# Patient Record
Sex: FEMALE | Race: WHITE | NOT HISPANIC OR LATINO | Employment: FULL TIME | URBAN - METROPOLITAN AREA
[De-identification: names, ages, dates, MRNs, and addresses within clinical notes are randomized per-mention and may not be internally consistent; named-entity substitution may affect disease eponyms.]

---

## 2017-02-16 ENCOUNTER — GENERIC CONVERSION - ENCOUNTER (OUTPATIENT)
Dept: OTHER | Facility: OTHER | Age: 57
End: 2017-02-16

## 2017-03-06 ENCOUNTER — ALLSCRIPTS OFFICE VISIT (OUTPATIENT)
Dept: OTHER | Facility: OTHER | Age: 57
End: 2017-03-06

## 2017-03-22 ENCOUNTER — GENERIC CONVERSION - ENCOUNTER (OUTPATIENT)
Dept: OTHER | Facility: OTHER | Age: 57
End: 2017-03-22

## 2017-03-29 ENCOUNTER — ALLSCRIPTS OFFICE VISIT (OUTPATIENT)
Dept: OTHER | Facility: OTHER | Age: 57
End: 2017-03-29

## 2017-05-06 ENCOUNTER — GENERIC CONVERSION - ENCOUNTER (OUTPATIENT)
Dept: OTHER | Facility: OTHER | Age: 57
End: 2017-05-06

## 2017-05-07 ENCOUNTER — GENERIC CONVERSION - ENCOUNTER (OUTPATIENT)
Dept: OTHER | Facility: OTHER | Age: 57
End: 2017-05-07

## 2017-05-07 LAB
INTERPRETATION (HISTORICAL): NORMAL
T4 FREE SERPL-MCNC: 1.33 NG/DL (ref 0.82–1.77)

## 2017-05-18 ENCOUNTER — GENERIC CONVERSION - ENCOUNTER (OUTPATIENT)
Dept: OTHER | Facility: OTHER | Age: 57
End: 2017-05-18

## 2017-05-18 ENCOUNTER — ALLSCRIPTS OFFICE VISIT (OUTPATIENT)
Dept: OTHER | Facility: OTHER | Age: 57
End: 2017-05-18

## 2017-05-25 ENCOUNTER — GENERIC CONVERSION - ENCOUNTER (OUTPATIENT)
Dept: OTHER | Facility: OTHER | Age: 57
End: 2017-05-25

## 2018-01-29 VITALS
HEART RATE: 88 BPM | HEIGHT: 68 IN | WEIGHT: 233 LBS | BODY MASS INDEX: 36.68 KG/M2 | RESPIRATION RATE: 18 BRPM | SYSTOLIC BLOOD PRESSURE: 130 MMHG | RESPIRATION RATE: 20 BRPM | HEART RATE: 100 BPM | TEMPERATURE: 98.2 F | TEMPERATURE: 97.6 F | BODY MASS INDEX: 35.31 KG/M2 | WEIGHT: 242 LBS | HEART RATE: 78 BPM | RESPIRATION RATE: 18 BRPM | WEIGHT: 230 LBS | DIASTOLIC BLOOD PRESSURE: 82 MMHG | TEMPERATURE: 99.9 F | BODY MASS INDEX: 34.86 KG/M2 | DIASTOLIC BLOOD PRESSURE: 80 MMHG | HEIGHT: 68 IN | HEIGHT: 68 IN | DIASTOLIC BLOOD PRESSURE: 76 MMHG | SYSTOLIC BLOOD PRESSURE: 132 MMHG | SYSTOLIC BLOOD PRESSURE: 132 MMHG

## 2018-01-29 DIAGNOSIS — I10 ESSENTIAL HYPERTENSION: Primary | ICD-10-CM

## 2018-01-29 RX ORDER — LEVOTHYROXINE SODIUM 150 UG/1
CAPSULE ORAL DAILY
COMMUNITY
Start: 2015-06-12 | End: 2018-02-07 | Stop reason: SDUPTHER

## 2018-01-29 RX ORDER — HYDROCHLOROTHIAZIDE 12.5 MG/1
1 CAPSULE, GELATIN COATED ORAL DAILY
COMMUNITY
Start: 2014-03-28 | End: 2018-01-29 | Stop reason: SDUPTHER

## 2018-01-29 RX ORDER — HYDROCHLOROTHIAZIDE 12.5 MG/1
12.5 CAPSULE, GELATIN COATED ORAL DAILY
Qty: 90 CAPSULE | Refills: 1 | Status: SHIPPED | OUTPATIENT
Start: 2018-01-29 | End: 2018-08-09 | Stop reason: SDUPTHER

## 2018-01-29 RX ORDER — AMLODIPINE BESYLATE 10 MG/1
10 TABLET ORAL DAILY
Qty: 90 TABLET | Refills: 1 | Status: SHIPPED | OUTPATIENT
Start: 2018-01-29 | End: 2018-02-06 | Stop reason: SDUPTHER

## 2018-01-29 RX ORDER — OMEPRAZOLE 40 MG/1
1 CAPSULE, DELAYED RELEASE ORAL DAILY
COMMUNITY
Start: 2016-02-29 | End: 2018-05-17 | Stop reason: SDUPTHER

## 2018-01-29 NOTE — RESULT NOTES
Discussion/Summary   Appointment 5/18/17   Please print and put in my folder  Dr Karine Pedroza      Verified Results  (1) TSH [id number] 07:31AM [employer]     Test Name Result Flag Reference   TSH 2 260 uIU/mL  0 450-4 500     (1) COMPREHENSIVE METABOLIC PANEL [id number] 86:62PF [employer]     Test Name Result Flag Reference   Glucose, Serum 105 mg/dL H 65-99   BUN 18 mg/dL  6-24   Creatinine, Serum 0 63 mg/dL  0 57-1 00   BUN/Creatinine Ratio 29 H 9-23   Sodium, Serum 142 mmol/L  134-144   Potassium, Serum 4 0 mmol/L  3 5-5 2   Chloride, Serum 101 mmol/L     Carbon Dioxide, Total 23 mmol/L  18-29   Calcium, Serum 9 8 mg/dL  8 7-10 2   Protein, Total, Serum 6 9 g/dL  6 0-8 5   Albumin, Serum 4 4 g/dL  3 5-5 5   Globulin, Total 2 5 g/dL  1 5-4 5   A/G Ratio 1 8  1 2-2 2   Bilirubin, Total 0 4 mg/dL  0 0-1 2   Alkaline Phosphatase, S 102 IU/L     AST (SGOT) 31 IU/L  0-40   ALT (SGPT) 42 IU/L H 0-32   eGFR If NonAfricn Am 99 mL/min/1 73  >59   eGFR If Africn Am 114 mL/min/1 73  >59     (1) LIPID PANEL FASTING W DIRECT LDL REFLEX [id number] 07:31AM [employer]     Test Name Result Flag Reference   Cholesterol, Total 188 mg/dL  100-199   Triglycerides 103 mg/dL  0-149   HDL Cholesterol 50 mg/dL  >39   LDL Cholesterol Calc 117 mg/dL H 0-99     (1) HEMOGLOBIN A1C [id number] 07:31AM [employer]     Test Name Result Flag Reference   Hemoglobin A1c 5 8 % H 4 8-5 6   Pre-diabetes: 5 7 - 6 4           Diabetes: >6 4           Glycemic control for adults with diabetes: <7 0     (LC) Thyroxine (T4) Free, Direct, S [id number] 07:31AM [employer]     Test Name Result Flag Reference   T4,Free(Direct) 1 33 ng/dL  0 82-1 77     [facility]) Cardiovascular Risk Assessment [id number] 07:31AM [facility]     Test Name Result Flag Reference   Interpretation Note     Supplement report is available  PDF Image

## 2018-01-29 NOTE — RESULT NOTES
Verified Results  * MAMMO SCREENING BILATERAL W CAD [id number] 12:53PM Martita Allan Order Number: [id number]     Test Name Result Flag Reference   MAMMO SCREENING BILATERAL W CAD (Report)     Patient History:   Patient is postmenopausal    Benign stereotactic FNA biopsy, December 1, 2010  Patient's BMI is 35 0  Reason for exam: screening (asymptomatic)  Screening     Mammo Screening Bilateral W CAD: August 8, 2016 - Check In #:    [de-identified]   Bilateral CC and MLO view(s) were taken  Technologist: NABIL Arreguin   Prior study comparison: August 4, 2015, bilateral screening    mammogram performed at [place] [address]  June 23, 2014, bilateral screening mammogram performed at [place] [address]  April 1, 2013, bilateral screening    mammogram performed at [place] [address]  January 18, 2012, bilateral screening mammogram performed at [place] [address]  There are scattered fibroglandular densities  Bilateral digital    mammography is performed  No dominant soft tissue mass,    architectural distortion or suspicious calcifications are noted  The skin and nipple contours are within normal limits  Scattered benign appearing calcifications are noted  A biopsy    clip in the right breast is again noted  No evidence of    malignancy  No significant changes when compared to prior    studies  1  No evidence of malignancy  2  No significant change when compared with the prior study  ASSESSMENT: BiRad:2 - Benign     Recommendation:   Routine screening mammogram of both breasts in 1 year  A reminder letter will be scheduled   Analyzed by CAD     8-10% of cancers will be missed on mammography  Management of a    palpable abnormality must be based on clinical grounds  Patients   will be notified of their results via letter from our facility  Accredited by [employer] of [employer] and FDA  Transcription Location: [facility] [address]: [id number]     Risk Value(s):   Tyrer-Cuzick 10 Year: 8 131%, Tyrer-Cuzick Lifetime: 22 581%,    Myriad Table: 1 5%, JESSICA 5 Year: 1 7%, NCI Lifetime: 10 2%   Signed by:   Guerda Alcantar MD   8/9/16       Discussion/Summary   Lida,   Your mammogram is normal    Dr Cinthia Johnson

## 2018-01-29 NOTE — RESULT NOTES
Message   Please inform patient the biopsies were negative for Morel's esophagus  She should continue her current medications for 3-6 months and then slowly wean off the pantoprazole or PPI therapy and follow-up as needed at that point  Verified Results  (1) TISSUE EXAM [id number] 08:43AM Karlis Picking     Test Name Result Flag Reference   LAB AP CASE REPORT (Report)     Surgical Pathology Report             Case: [id number]                   Authorizing Provider: Akosua Weaver MD      Collected:      06/24/2016 0843        Ordering Location:   [facility]  Received:      06/24/2016 [address]akeisha U  61                                      Pathologist:      Reena Correa MD                              Specimen:  Esophagogastric junction, cold bx rule out morel's esophagus   LAB AP FINAL DIAGNOSIS (Report)     A  Esophagus, GE junction, biopsy:        - Squamous mucosa with reactive features and fewer than 2   eosinophils per high power field  - Focal bacterial organisms are identified within disrupted   fragments of squamous epithelium (cocci)        - No intestinal metaplasia, dysplasia or malignancy is   identified  Interpretation performed at , [address]   Electronically signed by Reena Correa MD on 6/27/2016 at 3:40 PM   LAB AP SURGICAL ADDITIONAL INFORMATION (Report)     These tests were developed and their performance characteristics   determined by [facility] or [facility]  They may not be cleared or approved by the U S  Food and   Drug Administration  The FDA has determined that such clearance or   approval is not necessary  These tests are used for clinical purposes  They should not be regarded as investigational or for research  This   laboratory has been approved by CLIA 88, designated as a high-complexity   laboratory and is qualified to perform these tests     LAB AP SHAINA DESCRIPTION (Report)     A  The specimen is received in formalin, labeled with the patient's name   and hospital number, and is designated GE junction biopsy rule out   Neville's esophagus  The specimen consists of 4 tan soft tissue fragments   each measuring 0 3-0 4 cm  Entirely submitted  One cassette  Note: The estimated total formalin fixation time based upon information   provided by the submitting clinician and the standard processing schedule   is 19 75 hours      MAC       Signatures   Electronically signed by : ANABEL Chen ; Jul 11 2016  5:12PM EST                       (Author)

## 2018-01-29 NOTE — RESULT NOTES
Message   Appointment 5/24/16   Please print and put in my folder   Dr Lester Chaney     Verified Results  (1) COMPREHENSIVE METABOLIC PANEL [id number] 04:95EH [facility]     Test Name Result Flag Reference   Glucose, Serum 105 mg/dL H 65-99   BUN 19 mg/dL  6-24   Creatinine, Serum 0 71 mg/dL  0 57-1 00   eGFR If NonAfricn Am 95 mL/min/1 73  >59   eGFR If Africn Am 109 mL/min/1 73  >59   BUN/Creatinine Ratio 27 H 9-23   Sodium, Serum 141 mmol/L  134-144   Potassium, Serum 4 3 mmol/L  3 5-5 2   Chloride, Serum 101 mmol/L     Carbon Dioxide, Total 26 mmol/L  18-29   Calcium, Serum 9 9 mg/dL  8 7-10 2   Protein, Total, Serum 7 2 g/dL  6 0-8 5   Albumin, Serum 4 4 g/dL  3 5-5 5   Globulin, Total 2 8 g/dL  1 5-4 5   A/G Ratio 1 6  1 1-2 5   Bilirubin, Total 0 3 mg/dL  0 0-1 2   Alkaline Phosphatase, S 96 IU/L     AST (SGOT) 26 IU/L  0-40   ALT (SGPT) 40 IU/L H 0-32     (LC) Lipid Panel With LDL/HDL Ratio [id number] 07:31AM [facility]     Test Name Result Flag Reference   Cholesterol, Total 197 mg/dL  100-199   Triglycerides 116 mg/dL  0-149   HDL Cholesterol 52 mg/dL  >39   According to ATP-III Guidelines, HDL-C >59 mg/dL is considered a  negative risk factor for CHD  VLDL Cholesterol Jackson 23 mg/dL  5-40   LDL Cholesterol Calc 122 mg/dL H 0-99   LDL/HDL Ratio 2 3 ratio units  0 0-3 2   LDL/HDL Ratio                                                             Men  Women                                               1/2 Avg  Risk  1 0    1 5                                                   Avg Risk  3 6    3 2                                                2X Avg  Risk  6 2    5 0                                                3X Avg  Risk  8 0    6 1     (LC) TSH+Free T4 [id number] 07:31AM [facility]     Test Name Result Flag Reference   TSH 3 130 uIU/mL  0 450-4 500   T4,Free(Direct) 1 27 ng/dL  0 82-1 77     (1) FREE T3 [id number] 07:31AM [facility]     Test Name Result Flag Reference Triiodothyronine,Free,Serum 2 9 pg/mL  2 0-4 4     [facility]) Cardiovascular Risk Assessment [id number] 07:31AM Jono Jose     Test Name Result Flag Reference   Interpretation Note     Supplement report is available  PDF Image

## 2018-02-06 DIAGNOSIS — I10 ESSENTIAL HYPERTENSION: ICD-10-CM

## 2018-02-06 RX ORDER — AMLODIPINE BESYLATE 10 MG/1
10 TABLET ORAL DAILY
Qty: 90 TABLET | Refills: 0 | Status: SHIPPED | OUTPATIENT
Start: 2018-02-06 | End: 2018-08-14 | Stop reason: SDUPTHER

## 2018-02-07 DIAGNOSIS — I10 ESSENTIAL HYPERTENSION: ICD-10-CM

## 2018-02-07 DIAGNOSIS — R73.01 IMPAIRED FASTING GLUCOSE: ICD-10-CM

## 2018-02-07 DIAGNOSIS — E89.0 HYPOTHYROIDISM, POSTRADIOIODINE THERAPY: ICD-10-CM

## 2018-02-07 DIAGNOSIS — E03.9 HYPOTHYROIDISM, UNSPECIFIED TYPE: Primary | ICD-10-CM

## 2018-02-07 DIAGNOSIS — Z12.31 SCREENING MAMMOGRAM, ENCOUNTER FOR: ICD-10-CM

## 2018-02-07 RX ORDER — LEVOTHYROXINE SODIUM 150 UG/1
1 CAPSULE ORAL DAILY
Qty: 90 CAPSULE | Refills: 1 | Status: SHIPPED | OUTPATIENT
Start: 2018-02-07 | End: 2018-08-14 | Stop reason: SDUPTHER

## 2018-02-07 RX ORDER — LEVOTHYROXINE SODIUM 0.15 MG/1
150 TABLET ORAL DAILY
Refills: 0 | Status: CANCELLED | OUTPATIENT
Start: 2018-02-07

## 2018-02-07 NOTE — TELEPHONE ENCOUNTER
Pt is requesting a mammogram and labwork then she will make an apt for a follow up    Please advise  Thanks  lenore

## 2018-02-13 NOTE — RESULT NOTES
Message   Please inform patient the biopsies were negative for H  pylori  Please make sure that she is taking omeprazole regularly  She should have a repeat upper endoscopy in 3 months time to ensure healing of the severe esophagitis  Her colonoscopy was normal, please put in for 10 year colonoscopy recall  Verified Results  (1) TISSUE EXAM [id number] 09:04AM Sandra Michel     Test Name Result Flag Reference   LAB AP CASE REPORT (Report)     Surgical Pathology Report             Case: [id number]                   Authorizing Provider: Shante Quiñones MD      Collected:      02/29/2016 [id number]        Ordering Location:   [facility]  Received:      02/29/2016 [address] [place] [address]                                     Pathologist:      Dimitri Delgado MD                               Specimen:  Stomach, Gastric body biopsy gastritis r/o h pylori   LAB AP FINAL DIAGNOSIS (Report)     A  Gastric body, biopsy:    - Chronic inactive oxyntic & antral gastritis, negative for curvilinear   Helicobacter pylori, confirmed by immunohistochemical stains, evaluated   with appropriate positive & negative controls  - No atrophy or intestinal metaplasia identified     - No epithelial dysplasia and no evidence of malignancy  LAB AP SURGICAL ADDITIONAL INFORMATION (Report)     These tests were developed and their performance characteristics   determined by [address] [place] [address] or [facility]  They may not be cleared or approved by the [place] S  Food and   Drug Administration  The FDA has determined that such clearance or   approval is not necessary  These tests are used for clinical purposes  They should not be regarded as investigational or for research  This   laboratory has been approved by CLIA 88, designated as a high-complexity   laboratory and is qualified to perform these tests  LAB AP GROSS DESCRIPTION (Report)     A   The specimen is received in formalin, labeled with the patient's name   and hospital number, and is designated gastric body biopsy  The   specimen consists of 4 tan-pink soft tissue fragments measuring 0 1   centimeters, 0 4 centimeters, 0 6 centimeters, and 0 6 centimeters in   greatest dimension  Entirely submitted  One cassette  Note: The estimated total formalin fixation time based upon information   provided by the submitting clinician and the standard processing schedule   is 19 5 hours   MAS       Signatures   Electronically signed by : ANABEL Ruiz ; Mar 15 2016  5:04PM EST                       (Author)

## 2018-02-13 NOTE — RESULT NOTES
Verified Results  * XR FOOT 3+ VIEW LEFT [id number] 04:09PM Deacon Schmidt Order Number: [id number]     Test Name Result Flag Reference   XR FOOT 3+ VW LEFT (Report)     LEFT FOOT     INDICATION: Posttraumatic left 2nd toe pain   COMPARISON: None     VIEWS: 3; 3 images     FINDINGS:   Plantar and posterior calcaneal heel spurs     There is no acute fracture or dislocation  No degenerative changes  No lytic or blastic lesions are seen  Soft tissues are unremarkable  IMPRESSION:   Heel spurs   No acute osseous abnormality  Workstation performed: [id number]     Signed by:   Elen Hernandez MD   5/18/16       Discussion/Summary   Xray of the left foot was normal   No bone abnormalities seen  Next step is MRI or orthopedic surgeon evaluation as discussed    Dr Juliet Flores

## 2018-02-13 NOTE — RESULT NOTES
Message   Appointment 11/17/16   Please print and put in my folder  Dr Per Quintana      Verified Results  (1) MAGNESIUM [id number] 08:02AM Lelan Serge     Test Name Result Flag Reference   Magnesium, Serum 2 1 mg/dL  1 6-2 3     (1) VITAMIN B12 [id number] 08:02AM Lelan Serge     Test Name Result Flag Reference   Vitamin B12 362 pg/mL  211-946     (1) COMPREHENSIVE METABOLIC PANEL [id number] 42:50LK Lelan Serge     Test Name Result Flag Reference   Glucose, Serum 102 mg/dL H 65-99   BUN 15 mg/dL  6-24   Creatinine, Serum 0 75 mg/dL  0 57-1 00   eGFR If NonAfricn Am 89 mL/min/1 73  >59   eGFR If Africn Am 102 mL/min/1 73  >59   BUN/Creatinine Ratio 20  9-23   Sodium, Serum 143 mmol/L  136-144   Potassium, Serum 4 3 mmol/L  3 5-5 2   Chloride, Serum 102 mmol/L     Carbon Dioxide, Total 25 mmol/L  18-29   Calcium, Serum 9 8 mg/dL  8 7-10 2   Protein, Total, Serum 7 1 g/dL  6 0-8 5   Albumin, Serum 4 4 g/dL  3 5-5 5   Globulin, Total 2 7 g/dL  1 5-4 5   A/G Ratio 1 6  1 1-2 5   Bilirubin, Total 0 4 mg/dL  0 0-1 2   Alkaline Phosphatase, S 106 IU/L     AST (SGOT) 23 IU/L  0-40   ALT (SGPT) 47 IU/L H 0-32     (1) LIPID PANEL FASTING W DIRECT LDL REFLEX [id number] 08:02AM [email or web address]     Test Name Result Flag Reference   Cholesterol, Total 185 mg/dL  100-199   Triglycerides 95 mg/dL  0-149   HDL Cholesterol 53 mg/dL  >39   According to ATP-III Guidelines, HDL-C >59 mg/dL is considered a  negative risk factor for CHD     LDL Cholesterol Calc 113 mg/dL H 0-99     (1) TSH [id number] 08:02AM Lelan Serge     Test Name Result Flag Reference   TSH 2 260 uIU/mL  0 450-4 500     (1) HEMOGLOBIN A1C [id number] 08:02AM [facility]     Test Name Result Flag Reference   Hemoglobin A1c 6 2 % H 4 8-5 6   Pre-diabetes: 5 7 - 6 4           Diabetes: >6 4           Glycemic control for adults with diabetes: <7 0     (LC) Thyroxine (T4) Free, Direct, S [id number] 08:02AM Karine Kaur     Test Name Result Flag Reference T4,Free(Direct) 1 33 ng/dL  0 82-1 77     [facility]) Cardiovascular Risk Assessment [id number] 08:02AM Charity Pretty     Test Name Result Flag Reference   Interpretation Note     Supplement report is available  PDF Image

## 2018-03-07 LAB
ALBUMIN SERPL-MCNC: 4.5 G/DL (ref 3.5–5.5)
ALBUMIN/GLOB SERPL: 1.8 {RATIO} (ref 1.2–2.2)
ALP SERPL-CCNC: 101 IU/L (ref 39–117)
ALT SERPL-CCNC: 33 IU/L (ref 0–32)
AMBIG ABBREV DEFAULT: NORMAL
AMBIG ABBREV DEFAULT: NORMAL
AST SERPL-CCNC: 21 IU/L (ref 0–40)
BASOPHILS # BLD AUTO: 0 X10E3/UL (ref 0–0.2)
BASOPHILS NFR BLD AUTO: 1 %
BILIRUB SERPL-MCNC: 0.5 MG/DL (ref 0–1.2)
BUN SERPL-MCNC: 18 MG/DL (ref 6–24)
BUN/CREAT SERPL: 23 (ref 9–23)
CALCIUM SERPL-MCNC: 10.2 MG/DL (ref 8.7–10.2)
CHLORIDE SERPL-SCNC: 100 MMOL/L (ref 96–106)
CHOLEST SERPL-MCNC: 192 MG/DL (ref 100–199)
CO2 SERPL-SCNC: 27 MMOL/L (ref 18–29)
CREAT SERPL-MCNC: 0.79 MG/DL (ref 0.57–1)
EOSINOPHIL # BLD AUTO: 0.1 X10E3/UL (ref 0–0.4)
EOSINOPHIL NFR BLD AUTO: 2 %
ERYTHROCYTE [DISTWIDTH] IN BLOOD BY AUTOMATED COUNT: 14.5 % (ref 12.3–15.4)
GLOBULIN SER-MCNC: 2.5 G/DL (ref 1.5–4.5)
GLUCOSE SERPL-MCNC: 105 MG/DL (ref 65–99)
HBA1C MFR BLD: 5.7 % (ref 4.8–5.6)
HCT VFR BLD AUTO: 43.7 % (ref 34–46.6)
HDLC SERPL-MCNC: 54 MG/DL
HGB BLD-MCNC: 14.1 G/DL (ref 11.1–15.9)
IMM GRANULOCYTES # BLD: 0 X10E3/UL (ref 0–0.1)
IMM GRANULOCYTES NFR BLD: 0 %
LDLC SERPL CALC-MCNC: 115 MG/DL (ref 0–99)
LYMPHOCYTES # BLD AUTO: 2.1 X10E3/UL (ref 0.7–3.1)
LYMPHOCYTES NFR BLD AUTO: 38 %
MCH RBC QN AUTO: 30.1 PG (ref 26.6–33)
MCHC RBC AUTO-ENTMCNC: 32.3 G/DL (ref 31.5–35.7)
MCV RBC AUTO: 93 FL (ref 79–97)
MICRODELETION SYND BLD/T FISH: NORMAL
MONOCYTES # BLD AUTO: 0.4 X10E3/UL (ref 0.1–0.9)
MONOCYTES NFR BLD AUTO: 7 %
NEUTROPHILS # BLD AUTO: 2.9 X10E3/UL (ref 1.4–7)
NEUTROPHILS NFR BLD AUTO: 52 %
PLATELET # BLD AUTO: 233 X10E3/UL (ref 150–379)
POTASSIUM SERPL-SCNC: 4.3 MMOL/L (ref 3.5–5.2)
PROT SERPL-MCNC: 7 G/DL (ref 6–8.5)
RBC # BLD AUTO: 4.68 X10E6/UL (ref 3.77–5.28)
SL AMB EGFR AFRICAN AMERICAN: 95 ML/MIN/1.73
SL AMB EGFR NON AFRICAN AMERICAN: 82 ML/MIN/1.73
SL AMB VLDL CHOLESTEROL CALC: 23 MG/DL (ref 5–40)
SODIUM SERPL-SCNC: 142 MMOL/L (ref 134–144)
T4 FREE SERPL-MCNC: 1.43 NG/DL (ref 0.82–1.77)
TRIGL SERPL-MCNC: 113 MG/DL (ref 0–149)
TSH SERPL DL<=0.005 MIU/L-ACNC: 3.4 UIU/ML (ref 0.45–4.5)
WBC # BLD AUTO: 5.5 X10E3/UL (ref 3.4–10.8)

## 2018-03-16 ENCOUNTER — HOSPITAL ENCOUNTER (OUTPATIENT)
Dept: RADIOLOGY | Facility: HOSPITAL | Age: 58
Discharge: HOME | End: 2018-03-16
Payer: COMMERCIAL

## 2018-03-16 DIAGNOSIS — Z12.31 SCREENING MAMMOGRAM, ENCOUNTER FOR: ICD-10-CM

## 2018-03-16 PROCEDURE — 77067 SCR MAMMO BI INCL CAD: CPT

## 2018-05-17 DIAGNOSIS — K21.9 GERD WITHOUT ESOPHAGITIS: Primary | ICD-10-CM

## 2018-05-17 RX ORDER — OMEPRAZOLE 40 MG/1
40 CAPSULE, DELAYED RELEASE ORAL DAILY
Qty: 90 CAPSULE | Refills: 0 | Status: SHIPPED | OUTPATIENT
Start: 2018-05-17 | End: 2018-08-14 | Stop reason: SDUPTHER

## 2018-05-17 NOTE — TELEPHONE ENCOUNTER
I refilled her omeprazole, but it has been a year since  Her last appointment      Please ask her to schedule an appointment  Gaby Mantilla, DO

## 2018-05-18 NOTE — TELEPHONE ENCOUNTER
Spoke to patients  and he took the message for the patient    She will call and make an appointment when she gets home from work    No further action needed

## 2018-08-09 DIAGNOSIS — I10 ESSENTIAL HYPERTENSION: ICD-10-CM

## 2018-08-09 RX ORDER — HYDROCHLOROTHIAZIDE 12.5 MG/1
12.5 CAPSULE, GELATIN COATED ORAL DAILY
Qty: 90 CAPSULE | Refills: 0 | Status: SHIPPED | OUTPATIENT
Start: 2018-08-09 | End: 2018-08-14 | Stop reason: SDUPTHER

## 2018-08-09 NOTE — TELEPHONE ENCOUNTER
I called patient to find out if she meant to schedule with dr Clara Winters since dr Yash Driver is her PCP and that is who she has been seeing   Left message requesting a call back

## 2018-08-09 NOTE — TELEPHONE ENCOUNTER
She has not been seen in over a year    I refilled her HCTZ, but she needs an appointment  Елена Garcia, DO

## 2018-08-14 DIAGNOSIS — E03.9 HYPOTHYROIDISM, UNSPECIFIED TYPE: ICD-10-CM

## 2018-08-14 DIAGNOSIS — K21.9 GERD WITHOUT ESOPHAGITIS: ICD-10-CM

## 2018-08-14 DIAGNOSIS — I10 ESSENTIAL HYPERTENSION: ICD-10-CM

## 2018-08-14 RX ORDER — HYDROCHLOROTHIAZIDE 12.5 MG/1
12.5 CAPSULE, GELATIN COATED ORAL DAILY
Qty: 90 CAPSULE | Refills: 0 | Status: SHIPPED | OUTPATIENT
Start: 2018-08-14 | End: 2018-09-25 | Stop reason: SDUPTHER

## 2018-08-14 RX ORDER — OMEPRAZOLE 40 MG/1
40 CAPSULE, DELAYED RELEASE ORAL DAILY
Qty: 90 CAPSULE | Refills: 0 | Status: SHIPPED | OUTPATIENT
Start: 2018-08-14 | End: 2018-09-25 | Stop reason: SDUPTHER

## 2018-08-14 RX ORDER — LEVOTHYROXINE SODIUM 150 UG/1
1 CAPSULE ORAL DAILY
Qty: 90 CAPSULE | Refills: 0 | Status: SHIPPED | OUTPATIENT
Start: 2018-08-14 | End: 2018-09-25 | Stop reason: SDUPTHER

## 2018-08-14 RX ORDER — AMLODIPINE BESYLATE 10 MG/1
10 TABLET ORAL DAILY
Qty: 90 TABLET | Refills: 0 | Status: SHIPPED | OUTPATIENT
Start: 2018-08-14 | End: 2018-09-25 | Stop reason: SDUPTHER

## 2018-08-14 NOTE — TELEPHONE ENCOUNTER
Refill Omeprazole, hydrochlorothiazide, tirosint, amlodipine    [facility] If e-scribe goes to [place]    Thank you

## 2018-09-25 ENCOUNTER — OFFICE VISIT (OUTPATIENT)
Dept: FAMILY MEDICINE CLINIC | Facility: CLINIC | Age: 58
End: 2018-09-25
Payer: COMMERCIAL

## 2018-09-25 VITALS
DIASTOLIC BLOOD PRESSURE: 74 MMHG | RESPIRATION RATE: 16 BRPM | BODY MASS INDEX: 34.86 KG/M2 | SYSTOLIC BLOOD PRESSURE: 114 MMHG | HEIGHT: 68 IN | HEART RATE: 84 BPM | WEIGHT: 230 LBS | TEMPERATURE: 98.5 F

## 2018-09-25 DIAGNOSIS — R73.01 IMPAIRED FASTING GLUCOSE: ICD-10-CM

## 2018-09-25 DIAGNOSIS — Z79.899 ENCOUNTER FOR LONG-TERM CURRENT USE OF MEDICATION: ICD-10-CM

## 2018-09-25 DIAGNOSIS — E89.0 HYPOTHYROIDISM, POSTRADIOIODINE THERAPY: ICD-10-CM

## 2018-09-25 DIAGNOSIS — Z00.00 ANNUAL PHYSICAL EXAM: Primary | ICD-10-CM

## 2018-09-25 DIAGNOSIS — K21.9 GERD WITHOUT ESOPHAGITIS: ICD-10-CM

## 2018-09-25 DIAGNOSIS — I10 ESSENTIAL HYPERTENSION: ICD-10-CM

## 2018-09-25 PROCEDURE — 99396 PREV VISIT EST AGE 40-64: CPT | Performed by: FAMILY MEDICINE

## 2018-09-25 RX ORDER — AMLODIPINE BESYLATE 10 MG/1
10 TABLET ORAL DAILY
Qty: 90 TABLET | Refills: 3 | Status: SHIPPED | OUTPATIENT
Start: 2018-09-25 | End: 2020-02-21

## 2018-09-25 RX ORDER — OMEPRAZOLE 40 MG/1
40 CAPSULE, DELAYED RELEASE ORAL DAILY
Qty: 90 CAPSULE | Refills: 3 | Status: SHIPPED | OUTPATIENT
Start: 2018-09-25 | End: 2019-09-17 | Stop reason: SDUPTHER

## 2018-09-25 RX ORDER — LEVOTHYROXINE SODIUM 0.15 MG/1
150 TABLET ORAL DAILY
Qty: 90 TABLET | Refills: 3 | Status: SHIPPED | OUTPATIENT
Start: 2018-09-25 | End: 2019-01-02 | Stop reason: SDUPTHER

## 2018-09-25 RX ORDER — HYDROCHLOROTHIAZIDE 12.5 MG/1
12.5 CAPSULE, GELATIN COATED ORAL DAILY
Qty: 90 CAPSULE | Refills: 3 | Status: SHIPPED | OUTPATIENT
Start: 2018-09-25 | End: 2019-09-09 | Stop reason: SDUPTHER

## 2018-09-25 NOTE — LETTER
September 25, 2018     Patient: Naresh Chaves   YOB: 1960   Date of Visit: 9/25/2018       To Whom it May Concern:    Naresh Chaves is under my professional care  She was seen in my office on 9/25/2018  Due to medical condition [facility] needs to park as close as possible to work and avoid uneven surfaces  If you have any questions or concerns, please don't hesitate to call           Sincerely,          Felipe Reynolds DO        CC: No Recipients

## 2018-09-25 NOTE — PROGRESS NOTES
FAMILY PRACTICE HEALTH MAINTENANCE OFFICE VISIT  [facility]    NAME: Shantel Sanchez  AGE: 60 y o  SEX: female  : 1960     DATE: 2018    Assessment and Plan     Problem List Items Addressed This Visit     GERD without esophagitis     Unable to wean off          Relevant Medications    omeprazole (PriLOSEC) 40 MG capsule    Essential hypertension    Relevant Medications    hydrochlorothiazide (MICROZIDE) 12 5 mg capsule    amLODIPine (NORVASC) 10 mg tablet    Other Relevant Orders    CBC    Comprehensive metabolic panel    Lipid Panel with Direct LDL reflex    Hypothyroidism, postradioiodine therapy    Relevant Medications    levothyroxine 150 mcg tablet    Other Relevant Orders    TSH, 3rd generation    T4, free    Impaired fasting glucose    Relevant Orders    Comprehensive metabolic panel    Hemoglobin A1C      Other Visit Diagnoses     Annual physical exam    -  Primary    Encounter for long-term current use of medication        Relevant Orders    Vitamin B12    Magnesium            · Patient Counseling:   · Nutrition: Stressed importance of a well balanced diet, moderation of sodium/saturated fat, caloric balance and sufficient intake of fiber  · Exercise: Stressed the importance of regular exercise with a goal of 150 minutes per week  · Dental Health: Discussed daily flossing and brushing and regular dental visits     · Immunizations reviewed Flu shot is going to be done at work , Светлана Perdomo done this year  · Discussed benefits of screening mammogram    · Discussed the patient's BMI with her  The BMI is above average; BMI management plan is completed     Return in about 6 months (around 3/25/2019) for Next scheduled follow up  Chief Complaint     Chief Complaint   Patient presents with    Physical Exam     lj       History of Present Illness     She broke her foot 3 years ago and her whole foot cracked  She has pain with walking on the side walk by work  Well Adult Physical   Patient here for a comprehensive physical exam       Diet and Physical Activity  Diet: well balanced diet  Weight concerns: Patient has class 1 obesity (BMI 30-34  9)  Exercise: frequently      Depression Screen  PHQ-9 Depression Screening    PHQ-9:    Frequency of the following problems over the past two weeks:       Little interest or pleasure in doing things:  0 - not at all  Feeling down, depressed, or hopeless:  0 - not at all  PHQ-2 Score:  0          General Health  Hearing: Normal:  bilateral  Vision: wears glasses  Dental: regular dental visits    Reproductive Health  Having some stress incontinence      The following portions of the patient's history were reviewed and updated as appropriate: allergies, current medications, past family history, past medical history, past social history, past surgical history and problem list     Review of Systems     Review of Systems   Constitutional: Negative  Respiratory: Negative  Cardiovascular: Negative  Past Medical History     Past Medical History:   Diagnosis Date    Disease of thyroid gland     Herniated lumbar intervertebral disc     Hypertension     Hypothyroidism     Pneumonia        Past Surgical History     Past Surgical History:   Procedure Laterality Date    EGD AND COLONOSCOPY N/A 2/29/2016    Procedure: EGD AND COLONOSCOPY;  Surgeon: Tristan Arzate MD;  Location[facility]; Service:     ESOPHAGOGASTRODUODENOSCOPY N/A 6/24/2016    Procedure: ESOPHAGOGASTRODUODENOSCOPY (EGD); Surgeon: Tristan Arzate MD;  Location: [facility];   Service:     FOOT ARTHRODESIS, MODIFIED SCHUMACHER Right 2011    FOOT SURGERY      TOE SURGERY      hammtertoe, each toe       Social History     Social History     Social History    Marital status: /Civil Union     Spouse name: N/A    Number of children: N/A    Years of education: N/A     Occupational History          Social History Main Topics    Smoking status: Former Smoker     Quit date: 2/28/1989    Smokeless tobacco: Never Used    Alcohol use 4 2 oz/week     7 Glasses of wine per week      Comment: occ    Drug use: No    Sexual activity: Not Asked     Other Topics Concern    None     Social History Narrative    None       Family History     Family History   Problem Relation Age of Onset    Diabetes Mother     Heart disease Father         cardiac    Hypertension Father     Colon cancer Sister     Diabetes Family     Heart disease Family     Tuberculosis Other     Breast cancer Neg Hx     Mental illness Neg Hx        Current Medications       Current Outpatient Prescriptions:     amLODIPine (NORVASC) 10 mg tablet, Take 1 tablet (10 mg total) by mouth daily for 90 days, Disp: 90 tablet, Rfl: 3    hydrochlorothiazide (MICROZIDE) 12 5 mg capsule, Take 1 capsule (12 5 mg total) by mouth daily, Disp: 90 capsule, Rfl: 3    levothyroxine 150 mcg tablet, Take 1 tablet (150 mcg total) by mouth daily, Disp: 90 tablet, Rfl: 3    omeprazole (PriLOSEC) 40 MG capsule, Take 1 capsule (40 mg total) by mouth daily, Disp: 90 capsule, Rfl: 3     Allergies     Allergies   Allergen Reactions    Sulfa Antibiotics Hives       Objective     /74   Pulse 84   Temp 98 5 °F (36 9 °C)   Resp 16   Ht 5' 8" (1 727 m)   Wt 104 kg (230 lb)   LMP 06/29/2015 (Exact Date)   BMI 34 97 kg/m²      Physical Exam   Constitutional: She is oriented to person, place, and time  She appears well-developed and well-nourished  HENT:   Head: Normocephalic and atraumatic  Right Ear: External ear normal    Left Ear: External ear normal    Mouth/Throat: Oropharynx is clear and moist    Eyes: Pupils are equal, round, and reactive to light  Neck: Normal range of motion  Cardiovascular: Normal rate, regular rhythm and normal heart sounds  Exam reveals no friction rub  No murmur heard  Pulmonary/Chest: Effort normal and breath sounds normal  No respiratory distress   She has no wheezes  She has no rales  Abdominal: Soft  Bowel sounds are normal  She exhibits no distension and no mass  There is no tenderness  There is no rebound and no guarding  Musculoskeletal: Normal range of motion  She exhibits no edema  Neurological: She is alert and oriented to person, place, and time  She has normal reflexes  Skin: Skin is warm  Nursing note and vitals reviewed           Visual Acuity Screening    Right eye Left eye Both eyes   Without correction:      With correction: 20/20 20/20 20/20       Health Maintenance     Health Maintenance   Topic Date Due    Depression Screening PHQ  1960    DTaP,Tdap,and Td Vaccines (1 - Tdap) 01/17/1980    INFLUENZA VACCINE  09/01/2018    MAMMOGRAM  03/16/2019    PAP SMEAR  06/17/2020    CRC Screening: Colonoscopy  02/28/2026     Immunization History   Administered Date(s) Administered    Influenza Quadrivalent, 6-35 Months IM 11/18/2015    Influenza TIV (IM) 10/20/2016       Ebony Su DO  [address]

## 2018-10-04 DIAGNOSIS — E89.0 HYPOTHYROIDISM, POSTRADIOIODINE THERAPY: Primary | ICD-10-CM

## 2018-10-04 RX ORDER — LEVOTHYROXINE SODIUM 150 UG/1
CAPSULE ORAL
Qty: 84 CAPSULE | Refills: 1 | Status: SHIPPED | OUTPATIENT
Start: 2018-10-04 | End: 2019-01-01 | Stop reason: SDUPTHER

## 2018-10-06 LAB
ALBUMIN SERPL-MCNC: 4.5 G/DL (ref 3.5–5.5)
ALBUMIN/GLOB SERPL: 1.7 {RATIO} (ref 1.2–2.2)
ALP SERPL-CCNC: 100 IU/L (ref 39–117)
ALT SERPL-CCNC: 49 IU/L (ref 0–32)
AST SERPL-CCNC: 32 IU/L (ref 0–40)
BASOPHILS # BLD AUTO: 0 X10E3/UL (ref 0–0.2)
BASOPHILS NFR BLD AUTO: 1 %
BILIRUB SERPL-MCNC: 0.4 MG/DL (ref 0–1.2)
BUN SERPL-MCNC: 15 MG/DL (ref 6–24)
BUN/CREAT SERPL: 21 (ref 9–23)
CALCIUM SERPL-MCNC: 10 MG/DL (ref 8.7–10.2)
CHLORIDE SERPL-SCNC: 103 MMOL/L (ref 96–106)
CHOLEST SERPL-MCNC: 162 MG/DL (ref 100–199)
CO2 SERPL-SCNC: 23 MMOL/L (ref 20–29)
CREAT SERPL-MCNC: 0.73 MG/DL (ref 0.57–1)
EOSINOPHIL # BLD AUTO: 0.1 X10E3/UL (ref 0–0.4)
EOSINOPHIL NFR BLD AUTO: 2 %
ERYTHROCYTE [DISTWIDTH] IN BLOOD BY AUTOMATED COUNT: 13.8 % (ref 12.3–15.4)
GLOBULIN SER-MCNC: 2.6 G/DL (ref 1.5–4.5)
GLUCOSE SERPL-MCNC: 106 MG/DL (ref 65–99)
HBA1C MFR BLD: 5.9 % (ref 4.8–5.6)
HCT VFR BLD AUTO: 42.2 % (ref 34–46.6)
HDLC SERPL-MCNC: 50 MG/DL
HGB BLD-MCNC: 14.4 G/DL (ref 11.1–15.9)
IMM GRANULOCYTES # BLD: 0 X10E3/UL (ref 0–0.1)
IMM GRANULOCYTES NFR BLD: 0 %
LABCORP COMMENT: NORMAL
LDLC SERPL CALC-MCNC: 96 MG/DL (ref 0–99)
LYMPHOCYTES # BLD AUTO: 2 X10E3/UL (ref 0.7–3.1)
LYMPHOCYTES NFR BLD AUTO: 37 %
MAGNESIUM SERPL-MCNC: 2.2 MG/DL (ref 1.6–2.3)
MCH RBC QN AUTO: 30.7 PG (ref 26.6–33)
MCHC RBC AUTO-ENTMCNC: 34.1 G/DL (ref 31.5–35.7)
MCV RBC AUTO: 90 FL (ref 79–97)
MICRODELETION SYND BLD/T FISH: NORMAL
MONOCYTES # BLD AUTO: 0.6 X10E3/UL (ref 0.1–0.9)
MONOCYTES NFR BLD AUTO: 11 %
NEUTROPHILS # BLD AUTO: 2.7 X10E3/UL (ref 1.4–7)
NEUTROPHILS NFR BLD AUTO: 49 %
PLATELET # BLD AUTO: 215 X10E3/UL (ref 150–379)
POTASSIUM SERPL-SCNC: 4 MMOL/L (ref 3.5–5.2)
PROT SERPL-MCNC: 7.1 G/DL (ref 6–8.5)
RBC # BLD AUTO: 4.69 X10E6/UL (ref 3.77–5.28)
SL AMB EGFR AFRICAN AMERICAN: 104 ML/MIN/1.73
SL AMB EGFR NON AFRICAN AMERICAN: 90 ML/MIN/1.73
SODIUM SERPL-SCNC: 141 MMOL/L (ref 134–144)
T4 FREE SERPL-MCNC: 1.61 NG/DL (ref 0.82–1.77)
TRIGL SERPL-MCNC: 80 MG/DL (ref 0–149)
TSH SERPL DL<=0.005 MIU/L-ACNC: 1.36 UIU/ML (ref 0.45–4.5)
VIT B12 SERPL-MCNC: 899 PG/ML (ref 232–1245)
WBC # BLD AUTO: 5.4 X10E3/UL (ref 3.4–10.8)

## 2018-10-08 ENCOUNTER — TELEPHONE (OUTPATIENT)
Dept: FAMILY MEDICINE CLINIC | Facility: CLINIC | Age: 58
End: 2018-10-08

## 2018-10-08 DIAGNOSIS — R74.8 ELEVATED LIVER ENZYMES: Primary | ICD-10-CM

## 2018-10-08 NOTE — TELEPHONE ENCOUNTER
10/8/2018 11:16 AM Blas Carter returned my call  We discussed her lab results  Her ALT is up more  She agreed to get a liver ultrasound done  Order put in patient        Message complete  Shellie Hope,

## 2018-10-08 NOTE — TELEPHONE ENCOUNTER
10/8/2018 10:28 AM Message left on message on machine to call the office  Her ALT is up to 49       Rosa Tracy DO

## 2018-10-17 ENCOUNTER — TELEPHONE (OUTPATIENT)
Dept: FAMILY MEDICINE CLINIC | Facility: CLINIC | Age: 58
End: 2018-10-17

## 2018-10-17 NOTE — TELEPHONE ENCOUNTER
Tried calling, no answer and phone just rings  If pt calls back can you please ask her if she uses HCTZ  If so will need to sent to Dr Chandler Garg to see if she would like to change medication  Thank you   Rhys Dewitt

## 2018-10-17 NOTE — TELEPHONE ENCOUNTER
Heber Machuca from [address] called to ask about pts HCTZ  pts chart states pt is allergic to Sulfa, but Evan states HCTZ contained sulfa  Looks like HCTZ was prescribed and then discontinued on same day  Need clarification if pt is taking HCTZ and if she okay to take it? Called home hpone number, daughter stated pt was not home and wouldn't be home until after 3:30 today  Can we call her after 3:30 sometime and confirm this with her?  Rita Resendez MA

## 2018-11-13 DIAGNOSIS — K21.9 GERD WITHOUT ESOPHAGITIS: ICD-10-CM

## 2018-11-13 DIAGNOSIS — I10 ESSENTIAL HYPERTENSION: ICD-10-CM

## 2018-11-13 RX ORDER — OMEPRAZOLE 40 MG/1
CAPSULE, DELAYED RELEASE ORAL
Qty: 90 CAPSULE | Refills: 0 | Status: SHIPPED | OUTPATIENT
Start: 2018-11-13 | End: 2019-09-17 | Stop reason: SDUPTHER

## 2018-11-13 RX ORDER — AMLODIPINE BESYLATE 10 MG/1
10 TABLET ORAL DAILY
Qty: 90 TABLET | Refills: 0 | Status: SHIPPED | OUTPATIENT
Start: 2018-11-13 | End: 2019-10-07 | Stop reason: ALTCHOICE

## 2018-11-19 ENCOUNTER — HOSPITAL ENCOUNTER (OUTPATIENT)
Dept: RADIOLOGY | Facility: HOSPITAL | Age: 58
Discharge: HOME | End: 2018-11-19
Payer: COMMERCIAL

## 2018-11-19 DIAGNOSIS — R74.8 ELEVATED LIVER ENZYMES: ICD-10-CM

## 2018-11-19 PROCEDURE — 76705 ECHO EXAM OF ABDOMEN: CPT

## 2018-11-20 ENCOUNTER — TELEPHONE (OUTPATIENT)
Dept: FAMILY MEDICINE CLINIC | Facility: CLINIC | Age: 58
End: 2018-11-20

## 2018-11-20 NOTE — TELEPHONE ENCOUNTER
11/20/2018 12:14 PM Called her home number and was told to call her cell at work  I then called her cell phone and left a message on her voice mail to call the office  Called regarding her liver ultrasound results - it shows a fatty liver       Elli Ceja DO

## 2018-11-20 NOTE — TELEPHONE ENCOUNTER
11/20/2018 12:32 PM discussed results with patient  We discussed options to help fatty liver heal including weight loss, low sugar diet and exercise      Message complete  Otoniel Salazar DO

## 2019-01-01 DIAGNOSIS — E89.0 HYPOTHYROIDISM, POSTRADIOIODINE THERAPY: ICD-10-CM

## 2019-01-02 RX ORDER — LEVOTHYROXINE SODIUM 150 UG/1
CAPSULE ORAL
Qty: 84 CAPSULE | Refills: 1 | Status: SHIPPED | OUTPATIENT
Start: 2019-01-02 | End: 2019-07-09 | Stop reason: SDUPTHER

## 2019-01-02 NOTE — TELEPHONE ENCOUNTER
Please call patient regarding thyroid medication refill request   Her pharmacy has requested a refill for Tirosint, but she has been getting levothyroxine  I would like to continue to prescribe levothyroxine, but I was wondering if she requested the change? Please let me know which one she would prefer    Thanks,  Rosa Tracy, DO

## 2019-07-09 DIAGNOSIS — E89.0 HYPOTHYROIDISM, POSTRADIOIODINE THERAPY: ICD-10-CM

## 2019-07-09 RX ORDER — LEVOTHYROXINE SODIUM 150 UG/1
CAPSULE ORAL
Qty: 90 CAPSULE | Refills: 0 | Status: SHIPPED | OUTPATIENT
Start: 2019-07-09 | End: 2019-09-29 | Stop reason: SDUPTHER

## 2019-08-01 ENCOUNTER — TELEPHONE (OUTPATIENT)
Dept: FAMILY MEDICINE CLINIC | Facility: CLINIC | Age: 59
End: 2019-08-01

## 2019-08-01 DIAGNOSIS — Z12.31 SCREENING MAMMOGRAM, ENCOUNTER FOR: ICD-10-CM

## 2019-08-01 DIAGNOSIS — I10 ESSENTIAL HYPERTENSION: ICD-10-CM

## 2019-08-01 DIAGNOSIS — R73.01 IMPAIRED FASTING GLUCOSE: ICD-10-CM

## 2019-08-01 DIAGNOSIS — E89.0 HYPOTHYROIDISM, POSTRADIOIODINE THERAPY: Primary | ICD-10-CM

## 2019-08-03 LAB
ALBUMIN SERPL-MCNC: 4.5 G/DL (ref 3.6–4.8)
ALBUMIN/GLOB SERPL: 1.7 {RATIO} (ref 1.2–2.2)
ALP SERPL-CCNC: 87 IU/L (ref 39–117)
ALT SERPL-CCNC: 30 IU/L (ref 0–32)
AST SERPL-CCNC: 22 IU/L (ref 0–40)
BASOPHILS # BLD AUTO: 0 X10E3/UL (ref 0–0.2)
BASOPHILS NFR BLD AUTO: 0 %
BILIRUB SERPL-MCNC: 0.4 MG/DL (ref 0–1.2)
BUN SERPL-MCNC: 17 MG/DL (ref 8–27)
BUN/CREAT SERPL: 23 (ref 12–28)
CALCIUM SERPL-MCNC: 10.2 MG/DL (ref 8.7–10.3)
CHLORIDE SERPL-SCNC: 104 MMOL/L (ref 96–106)
CHOLEST SERPL-MCNC: 175 MG/DL (ref 100–199)
CO2 SERPL-SCNC: 23 MMOL/L (ref 20–29)
CREAT SERPL-MCNC: 0.75 MG/DL (ref 0.57–1)
EOSINOPHIL # BLD AUTO: 0.1 X10E3/UL (ref 0–0.4)
EOSINOPHIL NFR BLD AUTO: 2 %
ERYTHROCYTE [DISTWIDTH] IN BLOOD BY AUTOMATED COUNT: 13.9 % (ref 12.3–15.4)
GLOBULIN SER-MCNC: 2.6 G/DL (ref 1.5–4.5)
GLUCOSE SERPL-MCNC: 102 MG/DL (ref 65–99)
HBA1C MFR BLD: 5.7 % (ref 4.8–5.6)
HCT VFR BLD AUTO: 41.5 % (ref 34–46.6)
HDLC SERPL-MCNC: 54 MG/DL
HGB BLD-MCNC: 14 G/DL (ref 11.1–15.9)
IMM GRANULOCYTES # BLD: 0 X10E3/UL (ref 0–0.1)
IMM GRANULOCYTES NFR BLD: 0 %
LDLC SERPL CALC-MCNC: 106 MG/DL (ref 0–99)
LYMPHOCYTES # BLD AUTO: 1.8 X10E3/UL (ref 0.7–3.1)
LYMPHOCYTES NFR BLD AUTO: 35 %
MCH RBC QN AUTO: 30.4 PG (ref 26.6–33)
MCHC RBC AUTO-ENTMCNC: 33.7 G/DL (ref 31.5–35.7)
MCV RBC AUTO: 90 FL (ref 79–97)
MICRODELETION SYND BLD/T FISH: NORMAL
MONOCYTES # BLD AUTO: 0.5 X10E3/UL (ref 0.1–0.9)
MONOCYTES NFR BLD AUTO: 10 %
NEUTROPHILS # BLD AUTO: 2.6 X10E3/UL (ref 1.4–7)
NEUTROPHILS NFR BLD AUTO: 53 %
PLATELET # BLD AUTO: 198 X10E3/UL (ref 150–450)
POTASSIUM SERPL-SCNC: 4.2 MMOL/L (ref 3.5–5.2)
PROT SERPL-MCNC: 7.1 G/DL (ref 6–8.5)
RBC # BLD AUTO: 4.61 X10E6/UL (ref 3.77–5.28)
SL AMB EGFR AFRICAN AMERICAN: 100 ML/MIN/1.73
SL AMB EGFR NON AFRICAN AMERICAN: 87 ML/MIN/1.73
SODIUM SERPL-SCNC: 140 MMOL/L (ref 134–144)
T4 FREE SERPL-MCNC: 1.66 NG/DL (ref 0.82–1.77)
TRIGL SERPL-MCNC: 76 MG/DL (ref 0–149)
TSH SERPL DL<=0.005 MIU/L-ACNC: 1.28 UIU/ML (ref 0.45–4.5)
WBC # BLD AUTO: 5 X10E3/UL (ref 3.4–10.8)

## 2019-09-04 ENCOUNTER — HOSPITAL ENCOUNTER (OUTPATIENT)
Dept: RADIOLOGY | Facility: HOSPITAL | Age: 59
Discharge: HOME | End: 2019-09-04
Payer: COMMERCIAL

## 2019-09-04 VITALS — WEIGHT: 224 LBS | BODY MASS INDEX: 33.95 KG/M2 | HEIGHT: 68 IN

## 2019-09-04 DIAGNOSIS — Z12.31 SCREENING MAMMOGRAM, ENCOUNTER FOR: ICD-10-CM

## 2019-09-04 PROCEDURE — 77067 SCR MAMMO BI INCL CAD: CPT

## 2019-09-09 DIAGNOSIS — I10 ESSENTIAL HYPERTENSION: ICD-10-CM

## 2019-09-09 RX ORDER — AMLODIPINE BESYLATE 10 MG/1
TABLET ORAL
Qty: 90 TABLET | Refills: 1 | Status: SHIPPED | OUTPATIENT
Start: 2019-09-09 | End: 2019-10-07 | Stop reason: ALTCHOICE

## 2019-09-09 RX ORDER — HYDROCHLOROTHIAZIDE 12.5 MG/1
CAPSULE, GELATIN COATED ORAL
Qty: 90 CAPSULE | Refills: 1 | Status: SHIPPED | OUTPATIENT
Start: 2019-09-09 | End: 2020-10-14

## 2019-09-17 DIAGNOSIS — K21.9 GERD WITHOUT ESOPHAGITIS: Primary | ICD-10-CM

## 2019-09-17 RX ORDER — OMEPRAZOLE 40 MG/1
CAPSULE, DELAYED RELEASE ORAL
Qty: 90 CAPSULE | Refills: 1 | Status: SHIPPED | OUTPATIENT
Start: 2019-09-17 | End: 2020-02-28

## 2019-09-29 DIAGNOSIS — E89.0 HYPOTHYROIDISM, POSTRADIOIODINE THERAPY: ICD-10-CM

## 2019-09-29 RX ORDER — LEVOTHYROXINE SODIUM 150 UG/1
CAPSULE ORAL
Qty: 90 CAPSULE | Refills: 0 | Status: SHIPPED | OUTPATIENT
Start: 2019-09-29 | End: 2019-12-21 | Stop reason: SDUPTHER

## 2019-10-05 ENCOUNTER — HOSPITAL ENCOUNTER (EMERGENCY)
Facility: HOSPITAL | Age: 59
Discharge: HOME | End: 2019-10-05
Attending: EMERGENCY MEDICINE
Payer: COMMERCIAL

## 2019-10-05 ENCOUNTER — APPOINTMENT (EMERGENCY)
Dept: RADIOLOGY | Facility: HOSPITAL | Age: 59
End: 2019-10-05
Payer: COMMERCIAL

## 2019-10-05 VITALS
SYSTOLIC BLOOD PRESSURE: 156 MMHG | TEMPERATURE: 99.8 F | RESPIRATION RATE: 20 BRPM | OXYGEN SATURATION: 96 % | WEIGHT: 231 LBS | BODY MASS INDEX: 35.12 KG/M2 | HEART RATE: 93 BPM | DIASTOLIC BLOOD PRESSURE: 88 MMHG

## 2019-10-05 DIAGNOSIS — S92.919A TOE FRACTURE: Primary | ICD-10-CM

## 2019-10-05 PROCEDURE — 73660 X-RAY EXAM OF TOE(S): CPT

## 2019-10-05 PROCEDURE — 99283 EMERGENCY DEPT VISIT LOW MDM: CPT

## 2019-10-05 RX ORDER — OXYCODONE HYDROCHLORIDE AND ACETAMINOPHEN 5; 325 MG/1; MG/1
1 TABLET ORAL EVERY 4 HOURS PRN
Qty: 9 TABLET | Refills: 0 | Status: SHIPPED | OUTPATIENT
Start: 2019-10-05 | End: 2019-10-07 | Stop reason: ALTCHOICE

## 2019-10-05 RX ORDER — BUPIVACAINE HYDROCHLORIDE 2.5 MG/ML
10 INJECTION, SOLUTION EPIDURAL; INFILTRATION; INTRACAUDAL ONCE
Status: COMPLETED | OUTPATIENT
Start: 2019-10-05 | End: 2019-10-05

## 2019-10-05 RX ORDER — LIDOCAINE HYDROCHLORIDE 10 MG/ML
10 INJECTION, SOLUTION EPIDURAL; INFILTRATION; INTRACAUDAL; PERINEURAL ONCE
Status: COMPLETED | OUTPATIENT
Start: 2019-10-05 | End: 2019-10-05

## 2019-10-05 RX ADMIN — LIDOCAINE HYDROCHLORIDE 10 ML: 10 INJECTION, SOLUTION EPIDURAL; INFILTRATION; INTRACAUDAL; PERINEURAL at 15:39

## 2019-10-05 RX ADMIN — BUPIVACAINE HYDROCHLORIDE 10 ML: 2.5 INJECTION, SOLUTION EPIDURAL; INFILTRATION; INTRACAUDAL at 15:38

## 2019-10-05 NOTE — ED PROVIDER NOTES
History  Chief Complaint   Patient presents with    Toe Injury     states dog broke loose she tripped, fell and injured L big toe     60year old female presents with L toe pain x1 hour  Her dog broke loose and she tripped and fell on her foot  She later noticed that her big toe was facing inwards  No foot or ankle pain  No numbness or tingling  There is some bruising beginning to develop  She has worsened pain with ambulation  No nail injury  She has not taken anything for the pain  She has had several foot surgeries before in the past on the R and L foot in [facility]  She did not hit her head or lose consciousness  No nausea or vomiting  No LOC  Prior to Admission Medications   Prescriptions Last Dose Informant Patient Reported? Taking? TIROSINT 150 MCG CAPS 10/5/2019 at Unknown time  No Yes   Sig: TAKE 1 CAPSULE BY MOUTH DAILY   amLODIPine (NORVASC) 10 mg tablet   No No   Sig: Take 1 tablet (10 mg total) by mouth daily for 90 days   amLODIPine (NORVASC) 10 mg tablet   No No   Sig: TAKE 1 TABLET (10 MG TOTAL) BY MOUTH DAILY FOR 90 DAYS   amLODIPine (NORVASC) 10 mg tablet 10/4/2019 at Unknown time  No Yes   Sig: TAKE 1 TABLET BY MOUTH DAILY   hydrochlorothiazide (MICROZIDE) 12 5 mg capsule 10/4/2019 at Unknown time  No Yes   Sig: TAKE ONE CAPSULE BY MOUTH DAILY   omeprazole (PriLOSEC) 40 MG capsule 10/4/2019 at Unknown time  No Yes   Sig: TAKE 1 CAPSULE BY MOUTH DAILY      Facility-Administered Medications: None       Past Medical History:   Diagnosis Date    Disease of thyroid gland     Herniated lumbar intervertebral disc     Hypertension     Hypothyroidism     Pneumonia        Past Surgical History:   Procedure Laterality Date    BREAST BIOPSY Left 2010    negative    EGD AND COLONOSCOPY N/A 2/29/2016    Procedure: EGD AND COLONOSCOPY;  Surgeon: Fawad Salcedo MD;  Location: [place] [address] GI LAB;   Service:     ESOPHAGOGASTRODUODENOSCOPY N/A 6/24/2016    Procedure: ESOPHAGOGASTRODUODENOSCOPY (EGD); Surgeon: Mike Nice MD;  Location: [facility] GI LAB; Service:     FOOT ARTHRODESIS, MODIFIED SCHUMACHER Right 2011    FOOT SURGERY      TOE SURGERY      hammtertoe, each toe       Family History   Problem Relation Age of Onset    Diabetes Mother     Heart disease Father         cardiac    Hypertension Father     Colon cancer Sister     Diabetes Family     Heart disease Family     Tuberculosis Other     No Known Problems Sister     No Known Problems Sister     No Known Problems Sister     Colon cancer Sister     No Known Problems Daughter     No Known Problems Daughter     Mental illness Neg Hx      I have reviewed and agree with the history as documented  Social History     Tobacco Use    Smoking status: Former Smoker     Last attempt to quit: 1989     Years since quittin 6    Smokeless tobacco: Never Used   Substance Use Topics    Alcohol use: Yes     Alcohol/week: 7 0 standard drinks     Types: 7 Glasses of wine per week     Comment: occ    Drug use: No        Review of Systems   Constitutional: Negative for chills and fever  HENT: Negative for sneezing and sore throat  Respiratory: Negative for cough and shortness of breath  Cardiovascular: Negative for chest pain, palpitations and leg swelling  Gastrointestinal: Negative for abdominal pain, constipation, diarrhea, nausea and vomiting  Musculoskeletal: Positive for arthralgias, gait problem and myalgias  Negative for back pain and joint swelling  Skin: Negative for color change, pallor, rash and wound  Neurological: Negative for dizziness, syncope, weakness, light-headedness, numbness and headaches  All other systems reviewed and are negative  Physical Exam  Physical Exam   Constitutional: She appears well-developed and well-nourished  No distress  HENT:   Head: Normocephalic and atraumatic  Nose: Nose normal    Eyes: EOM are normal    Neck: Normal range of motion     Cardiovascular: Normal rate, regular rhythm, normal heart sounds and intact distal pulses  Exam reveals no gallop and no friction rub  No murmur heard  Pulmonary/Chest: Effort normal and breath sounds normal  No stridor  No respiratory distress  She has no wheezes  She has no rales  Sp02 is 96% indicating adequate oxygenation on room air   Musculoskeletal: Normal range of motion  She exhibits edema and tenderness  She exhibits no deformity  Feet:    Skin: Skin is warm and dry  Capillary refill takes less than 2 seconds  No rash noted  She is not diaphoretic  No erythema  No pallor  Nursing note and vitals reviewed  Vital Signs  ED Triage Vitals [10/05/19 1507]   Temperature Pulse Respirations Blood Pressure SpO2   99 8 °F (37 7 °C) 93 20 156/88 96 %      Temp Source Heart Rate Source Patient Position - Orthostatic VS BP Location FiO2 (%)   Tympanic Monitor Sitting Right arm --      Pain Score       7           Vitals:    10/05/19 1507   BP: 156/88   Pulse: 93   Patient Position - Orthostatic VS: Sitting         Visual Acuity      ED Medications  Medications   bupivacaine (PF) (MARCAINE) 0 25 % injection 10 mL (10 mL Infiltration Given 10/5/19 1538)   lidocaine (PF) (XYLOCAINE-MPF) 1 % injection 10 mL (10 mL Infiltration Given 10/5/19 1539)       Diagnostic Studies  Results Reviewed     None                 XR toe great min 2 views LEFT   Final Result by Hilario Molina MD (10/05 6357)      Acute, nondisplaced intra-articular fracture of the base of the hallux proximal phalanx              Workstation performed: [id number]                    Procedures  Procedures       ED Course  ED Course as of Oct 05 1800   Sat Oct 05, 2019   1535 Called reading room                                  MDM  Number of Diagnoses or Management Options  Toe fracture:   Diagnosis management comments: Straightened toe with aide of digital nerve block and stephanie taped toes together  Placed in surgical shoe  Advised rest, ice, elevation, and ibuprofen vs tylenol as needed for pain, percocet for severe pain  Given orthopedic follow up information  Gave patient proper education regarding diagnosis  Answered all questions  Return to ED for any worsening of symptoms otherwise follow up with primary care physician for re-evaluation  Discussed plan with patient who verbalized understanding and agreed to plan  Amount and/or Complexity of Data Reviewed  Tests in the radiology section of CPT®: ordered and reviewed  Discussion of test results with the performing providers: yes  Review and summarize past medical records: yes  Discuss the patient with other providers: yes  Independent visualization of images, tracings, or specimens: yes        Disposition  Final diagnoses: Toe fracture     Time reflects when diagnosis was documented in both MDM as applicable and the Disposition within this note     Time User Action Codes Description Comment    10/5/2019  5:00 PM Hina Kearns Add [T55 [id number]] Toe fracture       ED Disposition     ED Disposition Condition Date/Time Comment    Discharge Stable Sat Oct 5, 2019  5:00 PM Pedro Chaves discharge to home/self care              Follow-up Information     Follow up With Specialties Details Why Contact Info Additional Information    Evin Jorge MD Orthopedic Surgery Schedule an appointment as soon as possible for a visit in 3 days re-evaluation of toe fracture [address] Emergency Department Emergency Medicine Go to  As needed [address] [facility], Angelia Tamayo, [place], [address]          Discharge Medication List as of 10/5/2019  5:00 PM      CONTINUE these medications which have NOT CHANGED    Details   amLODIPine (NORVASC) 10 mg tablet TAKE 1 TABLET BY MOUTH DAILY, Normal      hydrochlorothiazide (MICROZIDE) 12 5 mg capsule TAKE ONE CAPSULE BY MOUTH DAILY, Normal omeprazole (PriLOSEC) 40 MG capsule TAKE 1 CAPSULE BY MOUTH DAILY, Normal      TIROSINT 150 MCG CAPS TAKE 1 CAPSULE BY MOUTH DAILY, Normal           No discharge procedures on file      ED Provider  Electronically Signed by           Valencia James PA-C  10/05/19 2772

## 2019-10-07 ENCOUNTER — OFFICE VISIT (OUTPATIENT)
Dept: FAMILY MEDICINE CLINIC | Facility: CLINIC | Age: 59
End: 2019-10-07
Payer: COMMERCIAL

## 2019-10-07 VITALS
BODY MASS INDEX: 35.01 KG/M2 | HEIGHT: 68 IN | WEIGHT: 231 LBS | TEMPERATURE: 99.2 F | RESPIRATION RATE: 16 BRPM | SYSTOLIC BLOOD PRESSURE: 124 MMHG | DIASTOLIC BLOOD PRESSURE: 76 MMHG | HEART RATE: 90 BPM | OXYGEN SATURATION: 97 %

## 2019-10-07 DIAGNOSIS — I10 ESSENTIAL HYPERTENSION: ICD-10-CM

## 2019-10-07 DIAGNOSIS — Z11.59 NEED FOR HEPATITIS C SCREENING TEST: ICD-10-CM

## 2019-10-07 DIAGNOSIS — Z79.899 ENCOUNTER FOR LONG-TERM CURRENT USE OF MEDICATION: ICD-10-CM

## 2019-10-07 DIAGNOSIS — E89.0 HYPOTHYROIDISM, POSTRADIOIODINE THERAPY: ICD-10-CM

## 2019-10-07 DIAGNOSIS — R73.01 IMPAIRED FASTING GLUCOSE: ICD-10-CM

## 2019-10-07 DIAGNOSIS — Z00.00 ANNUAL PHYSICAL EXAM: Primary | ICD-10-CM

## 2019-10-07 PROCEDURE — 99396 PREV VISIT EST AGE 40-64: CPT | Performed by: FAMILY MEDICINE

## 2019-10-07 NOTE — PROGRESS NOTES
FAMILY PRACTICE HEALTH MAINTENANCE OFFICE VISIT  [facility]    NAME: Kee Corbett  AGE: 60 y o  SEX: female  : 1960     DATE: 10/7/2019    Assessment and Plan     1  Annual physical exam    2  Impaired fasting glucose  -     Hemoglobin A1C; Future; Expected date: 2020  -     Hemoglobin A1C    3  Essential hypertension  Assessment & Plan:  Well controlled    Orders:  -     CBC; Future; Expected date: 2020  -     Comprehensive metabolic panel; Future; Expected date: 2020  -     Lipid Panel with Direct LDL reflex; Future; Expected date: 2020  -     CBC  -     Comprehensive metabolic panel  -     Lipid Panel with Direct LDL reflex    4  Hypothyroidism, postradioiodine therapy  -     T4, free; Future; Expected date: 2020  -     TSH, 3rd generation; Future; Expected date: 2020  -     T4, free  -     TSH, 3rd generation    5  Need for hepatitis C screening test  -     Hepatitis C antibody; Future; Expected date: 2020  -     Hepatitis C antibody    6  Encounter for long-term current use of medication  -     Vitamin B12; Future; Expected date: 2020  -     Magnesium; Future; Expected date: 2020  -     Vitamin B12  -     Magnesium          · Patient Counseling:   · Nutrition: Stressed importance of a well balanced diet, moderation of sodium/saturated fat, caloric balance and sufficient intake of fiber  · Exercise: Stressed the importance of regular exercise with a goal of 150 minutes per week  · Dental Health: Discussed daily flossing and brushing and regular dental visits     · Immunizations reviewed: flu shot scheduled at work in 2 weeks   · Discussed benefits of:  Mammogram , Cervical Cancer screening and Screening labs  Pap smear declined today even though she is almost due  She has a broken toe and can't get in the stirrups   BMI Counseling: Body mass index is 35 12 kg/m²  Discussed with patient's BMI with her   The BMI is above normal  Exercise recommendations include exercising 3-5 times per week  Return in about 6 months (around 4/7/2020) for Next scheduled follow up  Chief Complaint     Chief Complaint   Patient presents with    Physical Exam     [facility]       History of Present Illness     She is feeling okay except her broken toe  Well Adult Physical   Patient here for a comprehensive physical exam       Diet and Physical Activity  Diet: well balanced diet  Exercise: frequently      Depression Screen  PHQ-9 Depression Screening    PHQ-9:    Frequency of the following problems over the past two weeks:       Little interest or pleasure in doing things:  0 - not at all  Feeling down, depressed, or hopeless:  0 - not at all  PHQ-2 Score:  0          General Health  Hearing: Normal:  bilateral  Vision: wears glasses  Dental: regular dental visits    Reproductive Health  Post-menopausal       The following portions of the patient's history were reviewed and updated as appropriate: allergies, current medications, past family history, past medical history, past social history, past surgical history and problem list     Review of Systems     Review of Systems   Constitutional: Negative  Respiratory: Negative  Cardiovascular: Negative  Past Medical History     Past Medical History:   Diagnosis Date    Disease of thyroid gland     Herniated lumbar intervertebral disc     Hypertension     Hypothyroidism     Pneumonia        Past Surgical History     Past Surgical History:   Procedure Laterality Date    BREAST BIOPSY Left 2010    negative    EGD AND COLONOSCOPY N/A 2/29/2016    Procedure: EGD AND COLONOSCOPY;  Surgeon: Alvarado Mayberry MD;  [facility]; Service:     ESOPHAGOGASTRODUODENOSCOPY N/A 6/24/2016    Procedure: ESOPHAGOGASTRODUODENOSCOPY (EGD); Surgeon: Alvarado Mayberry MD;  Location: [facility] GI LAB;   Service:     FOOT ARTHRODESIS, MODIFIED SCHUMACHER Right 2011    FOOT SURGERY  TOE SURGERY      hammtertoe, each toe       Social History     Social History     Socioeconomic History    Marital status: /Civil Union     Spouse name: None    Number of children: None    Years of education: None    Highest education level: None   Occupational History    Occupation:    Social Needs    Financial resource strain: None    Food insecurity:     Worry: None     Inability: None    Transportation needs:     Medical: None     Non-medical: None   Tobacco Use    Smoking status: Former Smoker     Last attempt to quit: 1989     Years since quittin 6    Smokeless tobacco: Never Used   Substance and Sexual Activity    Alcohol use:  Yes     Alcohol/week: 7 0 standard drinks     Types: 7 Glasses of wine per week     Comment: occ    Drug use: No    Sexual activity: None   Lifestyle    Physical activity:     Days per week: None     Minutes per session: None    Stress: None   Relationships    Social connections:     Talks on phone: None     Gets together: None     Attends Synagogue service: None     Active member of club or organization: None     Attends meetings of clubs or organizations: None     Relationship status: None    Intimate partner violence:     Fear of current or ex partner: None     Emotionally abused: None     Physically abused: None     Forced sexual activity: None   Other Topics Concern    None   Social History Narrative    None       Family History     Family History   Problem Relation Age of Onset    Diabetes Mother     Heart disease Father         cardiac    Hypertension Father     Colon cancer Sister     Diabetes Family     Heart disease Family     Tuberculosis Other     No Known Problems Sister     No Known Problems Sister     No Known Problems Sister     Colon cancer Sister     No Known Problems Daughter     No Known Problems Daughter     Mental illness Neg Hx        Current Medications       Current Outpatient Medications:    amLODIPine (NORVASC) 10 mg tablet, Take 1 tablet (10 mg total) by mouth daily for 90 days, Disp: 90 tablet, Rfl: 3    hydrochlorothiazide (MICROZIDE) 12 5 mg capsule, TAKE ONE CAPSULE BY MOUTH DAILY, Disp: 90 capsule, Rfl: 1    omeprazole (PriLOSEC) 40 MG capsule, TAKE 1 CAPSULE BY MOUTH DAILY, Disp: 90 capsule, Rfl: 1    TIROSINT 150 MCG CAPS, TAKE 1 CAPSULE BY MOUTH DAILY, Disp: 90 capsule, Rfl: 0     Allergies     Allergies   Allergen Reactions    Sulfa Antibiotics Hives       Objective     /76   Pulse 90   Temp 99 2 °F (37 3 °C)   Resp 16   Ht 5' 8" (1 727 m)   Wt 105 kg (231 lb)   LMP 06/29/2015 (Exact Date)   SpO2 97%   BMI 35 12 kg/m²      Physical Exam   Constitutional: She is oriented to person, place, and time  She appears well-developed and well-nourished  HENT:   Head: Normocephalic and atraumatic  Right Ear: External ear normal    Left Ear: External ear normal    Mouth/Throat: Oropharynx is clear and moist    Eyes: Pupils are equal, round, and reactive to light  Neck: Normal range of motion  Cardiovascular: Normal rate, regular rhythm and normal heart sounds  Exam reveals no friction rub  No murmur heard  Pulmonary/Chest: Effort normal and breath sounds normal  No respiratory distress  She has no wheezes  She has no rales  Abdominal: Soft  Bowel sounds are normal  She exhibits no distension and no mass  There is no tenderness  There is no rebound and no guarding  Musculoskeletal: Normal range of motion  She exhibits no edema  Left foot in walking brace, slow gait   Neurological: She is alert and oriented to person, place, and time  She has normal reflexes  Skin: Skin is warm  Nursing note and vitals reviewed  Vision Screening Comments: Pt refused, was just at the eye  Thursday   [facility], [address]

## 2019-10-29 ENCOUNTER — OFFICE VISIT (OUTPATIENT)
Dept: FAMILY MEDICINE CLINIC | Facility: CLINIC | Age: 59
End: 2019-10-29
Payer: COMMERCIAL

## 2019-10-29 VITALS
HEART RATE: 107 BPM | SYSTOLIC BLOOD PRESSURE: 130 MMHG | DIASTOLIC BLOOD PRESSURE: 84 MMHG | WEIGHT: 227 LBS | HEIGHT: 68 IN | TEMPERATURE: 98.9 F | RESPIRATION RATE: 16 BRPM | BODY MASS INDEX: 34.4 KG/M2 | OXYGEN SATURATION: 98 %

## 2019-10-29 DIAGNOSIS — I10 ESSENTIAL HYPERTENSION: ICD-10-CM

## 2019-10-29 DIAGNOSIS — F43.21 GRIEF: ICD-10-CM

## 2019-10-29 DIAGNOSIS — E89.0 HYPOTHYROIDISM, POSTRADIOIODINE THERAPY: ICD-10-CM

## 2019-10-29 DIAGNOSIS — J06.9 ACUTE URI: Primary | ICD-10-CM

## 2019-10-29 PROCEDURE — 99214 OFFICE O/P EST MOD 30 MIN: CPT | Performed by: NURSE PRACTITIONER

## 2019-10-29 PROCEDURE — 3079F DIAST BP 80-89 MM HG: CPT | Performed by: NURSE PRACTITIONER

## 2019-10-29 PROCEDURE — 3008F BODY MASS INDEX DOCD: CPT | Performed by: NURSE PRACTITIONER

## 2019-10-29 PROCEDURE — 3075F SYST BP GE 130 - 139MM HG: CPT | Performed by: NURSE PRACTITIONER

## 2019-10-29 RX ORDER — AZITHROMYCIN 250 MG/1
TABLET, FILM COATED ORAL
Qty: 6 TABLET | Refills: 0 | Status: SHIPPED | OUTPATIENT
Start: 2019-10-29 | End: 2019-11-03

## 2019-10-29 RX ORDER — PROMETHAZINE HYDROCHLORIDE AND CODEINE PHOSPHATE 6.25; 1 MG/5ML; MG/5ML
5 SYRUP ORAL
Qty: 30 ML | Refills: 0 | Status: SHIPPED | OUTPATIENT
Start: 2019-10-29 | End: 2020-05-27 | Stop reason: ALTCHOICE

## 2019-10-29 NOTE — PATIENT INSTRUCTIONS
Take medication with food  It is important that you take the entire course of antibiotics prescribed  May also take a probiotic of your choice to maintain healthy GI sravanthi  Can take some probiotic and yogurt with the medication  Increase fluid intake, saline nasal rinses, and hot tea with honey and lemon  Cool air humidification can be helpful as well  May take Ibuprofen or Tylenol as needed for pain or fevers  Mucinex D for sinus congestion or Coricidin HBP if you have high blood pressure or a heart condition  Mucinex or Robitussin DM are effective for cough and chest congestion  Supportive care discussed and advised  Advised to RTO for any worsening and no improvement  Follow up for no improvement and worsening of conditions  Patient advised and educated when to see immediate medical care  Do not drive and operate any machinery after taking cough medication

## 2019-10-29 NOTE — PROGRESS NOTES
Assessment/Plan:    1  Acute URI  -     azithromycin (ZITHROMAX) 250 mg tablet; Take 500mg on day 1, 250mg on days 2-5  -     promethazine-codeine (PHENERGAN WITH CODEINE) 6 25-10 mg/5 mL syrup; Take 5 mL by mouth daily at bedtime    2  Essential hypertension  Comments:  stable with current regimen    3  Hypothyroidism, postradioiodine therapy  Comments:  Last TSH done in August 2019 and stable with current regimen    4  Grief          BMI Counseling: Body mass index is 34 52 kg/m²  Discussed the patient's BMI with her  The BMI is above normal  Nutrition recommendations include reducing portion sizes, decreasing overall calorie intake, 3-5 servings of fruits/vegetables daily, reducing fast food intake, consuming healthier snacks and decreasing soda and/or juice intake  Patient Instructions: Take medication with food  It is important that you take the entire course of antibiotics prescribed  May also take a probiotic of your choice to maintain healthy GI sravanthi  Can take some probiotic and yogurt with the medication  Increase fluid intake, saline nasal rinses, and hot tea with honey and lemon  Cool air humidification can be helpful as well  May take Ibuprofen or Tylenol as needed for pain or fevers  Mucinex D for sinus congestion or Coricidin HBP if you have high blood pressure or a heart condition  Mucinex or Robitussin DM are effective for cough and chest congestion  Supportive care discussed and advised  Advised to RTO for any worsening and no improvement  Follow up for no improvement and worsening of conditions  Patient advised and educated when to see immediate medical care  Do not drive and operate any machinery after taking cough medication  Return if symptoms worsen or fail to improve  No future appointments  Subjective:      Patient ID: Elsa Jenkins is a 60 y o  female      Chief Complaint   Patient presents with    Cold Like Symptoms     wmcma    Cough Vitals:  /84   Pulse (!) 107   Temp 98 9 °F (37 2 °C)   Resp 16   Ht 5' 8" (1 727 m)   Wt 103 kg (227 lb)   LMP 2015 (Exact Date)   SpO2 98%   BMI 34 52 kg/m²     HPI  Patient stated that started with cough and sore throat couple of days ago  Stated that not able to sleep at nigh time due to cough  Denies fever, sob and chest pain  Taking corcidin HBP but not helping with cough  Complaint with BP medication  Her mother passed today  The following portions of the patient's history were reviewed and updated as appropriate: allergies, current medications, past family history, past medical history, past social history, past surgical history and problem list       Review of Systems   Constitutional: Positive for chills and fatigue  Negative for diaphoresis, fever and unexpected weight change  HENT: Positive for sore throat  Negative for congestion, dental problem, drooling, ear discharge, ear pain, facial swelling, hearing loss, mouth sores, nosebleeds, postnasal drip, rhinorrhea, sinus pressure, sinus pain, sneezing, tinnitus, trouble swallowing and voice change  Respiratory: Positive for cough  Negative for chest tightness, shortness of breath and wheezing  Cardiovascular: Negative  Gastrointestinal: Negative for abdominal pain, constipation, diarrhea, nausea and vomiting  Genitourinary: Negative  Musculoskeletal: Negative  Skin: Negative  Neurological: Negative for dizziness, weakness, light-headedness and headaches  Hematological: Negative  Objective:    Social History     Tobacco Use   Smoking Status Former Smoker    Last attempt to quit: 1989    Years since quittin 6   Smokeless Tobacco Never Used       Allergies:    Allergies   Allergen Reactions    Sulfa Antibiotics Hives         Current Outpatient Medications   Medication Sig Dispense Refill    amLODIPine (NORVASC) 10 mg tablet Take 1 tablet (10 mg total) by mouth daily for 90 days 90 tablet 3    hydrochlorothiazide (MICROZIDE) 12 5 mg capsule TAKE ONE CAPSULE BY MOUTH DAILY 90 capsule 1    omeprazole (PriLOSEC) 40 MG capsule TAKE 1 CAPSULE BY MOUTH DAILY 90 capsule 1    TIROSINT 150 MCG CAPS TAKE 1 CAPSULE BY MOUTH DAILY 90 capsule 0    azithromycin (ZITHROMAX) 250 mg tablet Take 500mg on day 1, 250mg on days 2-5 6 tablet 0    promethazine-codeine (PHENERGAN WITH CODEINE) 6 25-10 mg/5 mL syrup Take 5 mL by mouth daily at bedtime 30 mL 0     No current facility-administered medications for this visit  Physical Exam   Constitutional: She is oriented to person, place, and time  She appears well-developed and well-nourished  HENT:   Head: Normocephalic  Right Ear: Tympanic membrane, external ear and ear canal normal    Left Ear: Tympanic membrane, external ear and ear canal normal    Nose: Nose normal  Right sinus exhibits no maxillary sinus tenderness and no frontal sinus tenderness  Left sinus exhibits no maxillary sinus tenderness and no frontal sinus tenderness  Mouth/Throat: Mucous membranes are normal  Posterior oropharyngeal erythema present  Neck: Neck supple  Cardiovascular: Normal rate, regular rhythm and normal heart sounds  Pulmonary/Chest: Effort normal and breath sounds normal    Abdominal: Normal appearance and bowel sounds are normal  There is no hepatosplenomegaly  There is no tenderness  There is no rebound  Musculoskeletal: Normal range of motion  Lymphadenopathy:        Right cervical: No superficial cervical and no posterior cervical adenopathy present  Left cervical: No superficial cervical and no posterior cervical adenopathy present  Neurological: She is alert and oriented to person, place, and time  Skin: Skin is warm and dry  Psychiatric: She has a normal mood and affect  Her behavior is normal  Judgment and thought content normal    Vitals reviewed              Lab Results   Component Value Date    TMN2SRPPZGFN 2 260 05/06/2017    TSH 1 280 08/02/2019           MYRON Salas

## 2019-12-21 DIAGNOSIS — E89.0 HYPOTHYROIDISM, POSTRADIOIODINE THERAPY: ICD-10-CM

## 2019-12-21 RX ORDER — LEVOTHYROXINE SODIUM 150 UG/1
CAPSULE ORAL
Qty: 90 CAPSULE | Refills: 1 | Status: SHIPPED | OUTPATIENT
Start: 2019-12-21 | End: 2020-05-19

## 2020-02-21 DIAGNOSIS — I10 ESSENTIAL HYPERTENSION: ICD-10-CM

## 2020-02-21 RX ORDER — AMLODIPINE BESYLATE 10 MG/1
TABLET ORAL
Qty: 90 TABLET | Refills: 1 | Status: SHIPPED | OUTPATIENT
Start: 2020-02-21 | End: 2020-08-03

## 2020-02-28 DIAGNOSIS — K21.9 GERD WITHOUT ESOPHAGITIS: ICD-10-CM

## 2020-02-28 RX ORDER — OMEPRAZOLE 40 MG/1
CAPSULE, DELAYED RELEASE ORAL
Qty: 90 CAPSULE | Refills: 1 | Status: SHIPPED | OUTPATIENT
Start: 2020-02-28 | End: 2020-08-08

## 2020-05-19 DIAGNOSIS — E89.0 HYPOTHYROIDISM, POSTRADIOIODINE THERAPY: ICD-10-CM

## 2020-05-19 RX ORDER — LEVOTHYROXINE SODIUM 150 UG/1
CAPSULE ORAL
Qty: 90 CAPSULE | Refills: 1 | Status: SHIPPED | OUTPATIENT
Start: 2020-05-19 | End: 2020-07-24

## 2020-05-20 ENCOUNTER — TELEPHONE (OUTPATIENT)
Dept: FAMILY MEDICINE CLINIC | Facility: CLINIC | Age: 60
End: 2020-05-20

## 2020-05-20 DIAGNOSIS — E89.0 HYPOTHYROIDISM, POSTRADIOIODINE THERAPY: Primary | ICD-10-CM

## 2020-05-20 PROBLEM — K90.41 NON-CELIAC GLUTEN SENSITIVITY: Status: ACTIVE | Noted: 2020-05-20

## 2020-05-27 ENCOUNTER — TELEPHONE (OUTPATIENT)
Dept: FAMILY MEDICINE CLINIC | Facility: CLINIC | Age: 60
End: 2020-05-27

## 2020-05-27 ENCOUNTER — OFFICE VISIT (OUTPATIENT)
Dept: FAMILY MEDICINE CLINIC | Facility: CLINIC | Age: 60
End: 2020-05-27
Payer: COMMERCIAL

## 2020-05-27 VITALS
HEIGHT: 68 IN | TEMPERATURE: 98.1 F | SYSTOLIC BLOOD PRESSURE: 128 MMHG | RESPIRATION RATE: 18 BRPM | WEIGHT: 233 LBS | BODY MASS INDEX: 35.31 KG/M2 | OXYGEN SATURATION: 96 % | DIASTOLIC BLOOD PRESSURE: 72 MMHG | HEART RATE: 78 BPM

## 2020-05-27 DIAGNOSIS — K21.9 GERD WITHOUT ESOPHAGITIS: ICD-10-CM

## 2020-05-27 DIAGNOSIS — I10 ESSENTIAL HYPERTENSION: Primary | ICD-10-CM

## 2020-05-27 DIAGNOSIS — R73.01 IMPAIRED FASTING GLUCOSE: ICD-10-CM

## 2020-05-27 DIAGNOSIS — E89.0 HYPOTHYROIDISM, POSTRADIOIODINE THERAPY: ICD-10-CM

## 2020-05-27 PROCEDURE — 99214 OFFICE O/P EST MOD 30 MIN: CPT | Performed by: NURSE PRACTITIONER

## 2020-05-27 PROCEDURE — 1036F TOBACCO NON-USER: CPT | Performed by: NURSE PRACTITIONER

## 2020-05-27 PROCEDURE — 3078F DIAST BP <80 MM HG: CPT | Performed by: NURSE PRACTITIONER

## 2020-05-27 PROCEDURE — 3074F SYST BP LT 130 MM HG: CPT | Performed by: NURSE PRACTITIONER

## 2020-05-27 PROCEDURE — 3008F BODY MASS INDEX DOCD: CPT | Performed by: NURSE PRACTITIONER

## 2020-07-23 LAB
ALBUMIN SERPL-MCNC: 4.3 G/DL (ref 3.8–4.8)
ALBUMIN/GLOB SERPL: 1.8 {RATIO} (ref 1.2–2.2)
ALP SERPL-CCNC: 90 IU/L (ref 39–117)
ALT SERPL-CCNC: 49 IU/L (ref 0–32)
AST SERPL-CCNC: 33 IU/L (ref 0–40)
BILIRUB SERPL-MCNC: 0.4 MG/DL (ref 0–1.2)
BUN SERPL-MCNC: 16 MG/DL (ref 8–27)
BUN/CREAT SERPL: 22 (ref 12–28)
CALCIUM SERPL-MCNC: 9.8 MG/DL (ref 8.7–10.3)
CHLORIDE SERPL-SCNC: 101 MMOL/L (ref 96–106)
CHOLEST SERPL-MCNC: 192 MG/DL (ref 100–199)
CO2 SERPL-SCNC: 22 MMOL/L (ref 20–29)
CREAT SERPL-MCNC: 0.72 MG/DL (ref 0.57–1)
ERYTHROCYTE [DISTWIDTH] IN BLOOD BY AUTOMATED COUNT: 13 % (ref 11.7–15.4)
EST. AVERAGE GLUCOSE BLD GHB EST-MCNC: 123 MG/DL
GLOBULIN SER-MCNC: 2.4 G/DL (ref 1.5–4.5)
GLUCOSE SERPL-MCNC: 102 MG/DL (ref 65–99)
HBA1C MFR BLD: 5.9 % (ref 4.8–5.6)
HCT VFR BLD AUTO: 42.6 % (ref 34–46.6)
HCV AB S/CO SERPL IA: 0.1 S/CO RATIO (ref 0–0.9)
HDLC SERPL-MCNC: 51 MG/DL
HGB BLD-MCNC: 14.3 G/DL (ref 11.1–15.9)
LDLC SERPL CALC-MCNC: 114 MG/DL (ref 0–99)
LDLC/HDLC SERPL: 2.2 RATIO (ref 0–3.2)
MAGNESIUM SERPL-MCNC: 2.2 MG/DL (ref 1.6–2.3)
MCH RBC QN AUTO: 30.7 PG (ref 26.6–33)
MCHC RBC AUTO-ENTMCNC: 33.6 G/DL (ref 31.5–35.7)
MCV RBC AUTO: 91 FL (ref 79–97)
MICRODELETION SYND BLD/T FISH: NORMAL
PLATELET # BLD AUTO: 193 X10E3/UL (ref 150–450)
POTASSIUM SERPL-SCNC: 4.1 MMOL/L (ref 3.5–5.2)
PROT SERPL-MCNC: 6.7 G/DL (ref 6–8.5)
RBC # BLD AUTO: 4.66 X10E6/UL (ref 3.77–5.28)
SL AMB EGFR AFRICAN AMERICAN: 105 ML/MIN/1.73
SL AMB EGFR NON AFRICAN AMERICAN: 91 ML/MIN/1.73
SL AMB VLDL CHOLESTEROL CALC: 27 MG/DL (ref 5–40)
SODIUM SERPL-SCNC: 141 MMOL/L (ref 134–144)
T4 FREE SERPL-MCNC: 1.5 NG/DL (ref 0.82–1.77)
TRIGL SERPL-MCNC: 137 MG/DL (ref 0–149)
TSH SERPL DL<=0.005 MIU/L-ACNC: 3.19 UIU/ML (ref 0.45–4.5)
VIT B12 SERPL-MCNC: 817 PG/ML (ref 232–1245)
WBC # BLD AUTO: 5.3 X10E3/UL (ref 3.4–10.8)

## 2020-07-24 ENCOUNTER — TELEPHONE (OUTPATIENT)
Dept: FAMILY MEDICINE CLINIC | Facility: CLINIC | Age: 60
End: 2020-07-24

## 2020-07-24 DIAGNOSIS — R74.01 ELEVATED ALT MEASUREMENT: ICD-10-CM

## 2020-07-24 DIAGNOSIS — E03.9 HYPOTHYROIDISM, UNSPECIFIED TYPE: Primary | ICD-10-CM

## 2020-07-24 RX ORDER — LEVOTHYROXINE SODIUM 0.15 MG/1
150 TABLET ORAL DAILY
Qty: 90 TABLET | Refills: 0 | Status: SHIPPED | OUTPATIENT
Start: 2020-07-24 | End: 2020-08-28 | Stop reason: ALTCHOICE

## 2020-07-24 NOTE — TELEPHONE ENCOUNTER
Patient called and blood work discussed  Advised on diet/weight management/excercise for elevation in HbAic and LDL levles  ALT slightly up and had US liver done in past  Was also up in year ago and was back to normal, recently was on vacation and was not eating healthy  Will repeat levels in a month before next appt and if still elevated then will follow up with further testing   MYRON Agrawal

## 2020-08-02 DIAGNOSIS — I10 ESSENTIAL HYPERTENSION: ICD-10-CM

## 2020-08-03 RX ORDER — AMLODIPINE BESYLATE 10 MG/1
TABLET ORAL
Qty: 90 TABLET | Refills: 1 | Status: SHIPPED | OUTPATIENT
Start: 2020-08-03 | End: 2021-01-12

## 2020-08-08 DIAGNOSIS — K21.9 GERD WITHOUT ESOPHAGITIS: ICD-10-CM

## 2020-08-08 RX ORDER — OMEPRAZOLE 40 MG/1
CAPSULE, DELAYED RELEASE ORAL
Qty: 90 CAPSULE | Refills: 1 | Status: SHIPPED | OUTPATIENT
Start: 2020-08-08 | End: 2021-01-22

## 2020-08-10 ENCOUNTER — TELEMEDICINE (OUTPATIENT)
Dept: FAMILY MEDICINE CLINIC | Facility: CLINIC | Age: 60
End: 2020-08-10
Payer: COMMERCIAL

## 2020-08-10 ENCOUNTER — TELEPHONE (OUTPATIENT)
Dept: FAMILY MEDICINE CLINIC | Facility: CLINIC | Age: 60
End: 2020-08-10

## 2020-08-10 DIAGNOSIS — E89.0 HYPOTHYROIDISM, POSTRADIOIODINE THERAPY: Primary | ICD-10-CM

## 2020-08-10 DIAGNOSIS — Z78.9 DRUG INTOLERANCE: ICD-10-CM

## 2020-08-10 PROCEDURE — 1036F TOBACCO NON-USER: CPT | Performed by: NURSE PRACTITIONER

## 2020-08-10 PROCEDURE — 3074F SYST BP LT 130 MM HG: CPT | Performed by: NURSE PRACTITIONER

## 2020-08-10 PROCEDURE — 99213 OFFICE O/P EST LOW 20 MIN: CPT | Performed by: NURSE PRACTITIONER

## 2020-08-10 PROCEDURE — 3078F DIAST BP <80 MM HG: CPT | Performed by: NURSE PRACTITIONER

## 2020-08-10 RX ORDER — LEVOTHYROXINE SODIUM 150 UG/1
1 CAPSULE ORAL DAILY
Qty: 30 CAPSULE | Refills: 2 | Status: SHIPPED | OUTPATIENT
Start: 2020-08-10 | End: 2020-08-28 | Stop reason: SDUPTHER

## 2020-08-10 NOTE — TELEPHONE ENCOUNTER
Called and [facility] is sending forms to start a new PA   ID [id number]   [phone]  [facility] [phone]   angel

## 2020-08-10 NOTE — TELEPHONE ENCOUNTER
Pt was put on generic levothyroxine  Pt is having symptoms hot flashes, stomach pain, throwing up, diarrhea  Pt was put in for virtual visit with Madai Peters today 8/10   FYI

## 2020-08-10 NOTE — TELEPHONE ENCOUNTER
Pt is unable to be on generic levothyroxine  Pt has multiple symptoms of Headache, Gluten intolerent, hot flashes, nail breaking, hair loss, dry eyes  Pt only can take Tirosint    Will call pharmacy for ID and phone number to start LUIS naqvi

## 2020-08-10 NOTE — PROGRESS NOTES
Virtual Regular Visit    Will try to prior auth and will follow up with patient  Assessment/Plan:    Problem List Items Addressed This Visit        Endocrine    Hypothyroidism, postradioiodine therapy - Primary    Relevant Medications    Levothyroxine Sodium (Tirosint) 150 MCG CAPS      Other Visit Diagnoses     Drug intolerance            Supportive care discussed and advised  Advised to RTO for any worsening and no improvement  Follow up for no improvement and worsening of conditions  Patient advised and educated when to see immediate medical care  Reason for visit is   Chief Complaint   Patient presents with    Virtual Regular Visit        Encounter provider MYRON Shaffer    Provider located at  [address] [place] [id number]      Recent Visits  No visits were found meeting these conditions  Showing recent visits within past 7 days and meeting all other requirements     Today's Visits  Date Type Provider Dept   08/10/20 Telephone [facility]   08/10/20 Telemedicine [facility]   08/10/20 Telephone [facility], [address] [place] [address] today's visits and meeting all other requirements     Future Appointments  No visits were found meeting these conditions  Showing future appointments within next 150 days and meeting all other requirements        The patient was identified by name and date of birth  Doron Wilkinson was informed that this is a telemedicine visit and that the visit is being conducted through [address] [place] [address] and patient was informed that this is not a secure, HIPAA-complaint platform  She agrees to proceed     My office door was closed  No one else was in the room  She acknowledged consent and understanding of privacy and security of the video platform  The patient has agreed to participate and understands they can discontinue the visit at any time      Patient is aware this is a billable service  Subjective  Jennifer Riojas is a 63 y o  female   HPI   Patient used to take tirosint and was declined by insurance and then started on levothyroxine  Patient stated that started taking levothryoxine 2 weeks on and off with trisoint but now from couple of days ago completley taking levothyroxine and Started with headache and migraines and hot flashes and having stomach issues where she is throwing up and having abdominal cramping  Stated that has h/o gluten intolerance and checked and noticed that levothyroxine has 12 additives of gluten class where tirosint has only 4 which might be causing stomach issues  Past Medical History:   Diagnosis Date    Disease of thyroid gland     Herniated lumbar intervertebral disc     Hypertension     Hypothyroidism     Pneumonia        Past Surgical History:   Procedure Laterality Date    BREAST BIOPSY Left 2010    negative    EGD AND COLONOSCOPY N/A 2/29/2016    Procedure: EGD AND COLONOSCOPY;  Surgeon: Dann Morales MD;  Location: [facility]; Service:     ESOPHAGOGASTRODUODENOSCOPY N/A 6/24/2016    Procedure: ESOPHAGOGASTRODUODENOSCOPY (EGD); Surgeon: Dann Morales MD;  Location[facility]; Service:     FOOT ARTHRODESIS, MODIFIED SCHUMACHER Right 2011    FOOT SURGERY      TOE SURGERY      hammtertoe, each toe       Current Outpatient Medications   Medication Sig Dispense Refill    amLODIPine (NORVASC) 10 mg tablet TAKE 1 TABLET BY MOUTH DAILY 90 tablet 1    hydrochlorothiazide (MICROZIDE) 12 5 mg capsule TAKE ONE CAPSULE BY MOUTH DAILY 90 capsule 1    levothyroxine 150 mcg tablet Take 1 tablet (150 mcg total) by mouth daily 90 tablet 0    Levothyroxine Sodium (Tirosint) 150 MCG CAPS Take 1 capsule (150 mcg total) by mouth daily 30 capsule 2    omeprazole (PriLOSEC) 40 MG capsule TAKE ONE CAPSULE BY MOUTH DAILY 90 capsule 1     No current facility-administered medications for this visit           Allergies   Allergen Reactions    Sulfa Antibiotics Hives       Review of Systems   Constitutional: Negative  HENT: Negative  Respiratory: Negative  Cardiovascular: Negative  Gastrointestinal: Positive for vomiting  Negative for nausea  As noted in HPI     Genitourinary: Negative  Skin: Negative  Neurological: Positive for headaches  Psychiatric/Behavioral: Negative  Video Exam    There were no vitals filed for this visit  Physical Exam  Constitutional:       Appearance: She is well-developed  HENT:      Nose: Nose normal    Eyes:      Conjunctiva/sclera: Conjunctivae normal    Neck:      Musculoskeletal: Normal range of motion  Pulmonary:      Effort: Pulmonary effort is normal    Skin:     Comments: No diaphoresis noted on video call   Neurological:      Mental Status: She is alert and oriented to person, place, and time  Psychiatric:         Behavior: Behavior normal          Thought Content: Thought content normal          Judgment: Judgment normal           I spent 10 minutes directly with the patient during this visit      VIRTUAL VISIT DISCLAIMER    Comfort Pickard acknowledges that she has consented to an online visit or consultation  She understands that the online visit is based solely on information provided by her, and that, in the absence of a face-to-face physical evaluation by the physician, the diagnosis she receives is both limited and provisional in terms of accuracy and completeness  This is not intended to replace a full medical face-to-face evaluation by the physician  Comfort Pickard understands and accepts these terms

## 2020-08-11 NOTE — TELEPHONE ENCOUNTER
Approved until 08/10/2021  Pt and pharmacy are aware  Pt will be requesting 90 day supply once prescription for this month picked up by pt from [facility]   Dr Srini Holman and Rocio Bee are aware    Task Complete  [email or web address]

## 2020-08-20 LAB
ALBUMIN SERPL-MCNC: 4.4 G/DL (ref 3.8–4.8)
ALBUMIN/GLOB SERPL: 1.8 {RATIO} (ref 1.2–2.2)
ALP SERPL-CCNC: 90 IU/L (ref 39–117)
ALT SERPL-CCNC: 46 IU/L (ref 0–32)
AST SERPL-CCNC: 33 IU/L (ref 0–40)
BILIRUB SERPL-MCNC: 0.4 MG/DL (ref 0–1.2)
BUN SERPL-MCNC: 20 MG/DL (ref 8–27)
BUN/CREAT SERPL: 24 (ref 12–28)
CALCIUM SERPL-MCNC: 10.1 MG/DL (ref 8.7–10.3)
CHLORIDE SERPL-SCNC: 102 MMOL/L (ref 96–106)
CO2 SERPL-SCNC: 25 MMOL/L (ref 20–29)
CREAT SERPL-MCNC: 0.85 MG/DL (ref 0.57–1)
GLOBULIN SER-MCNC: 2.5 G/DL (ref 1.5–4.5)
GLUCOSE SERPL-MCNC: 103 MG/DL (ref 65–99)
POTASSIUM SERPL-SCNC: 4.2 MMOL/L (ref 3.5–5.2)
PROT SERPL-MCNC: 6.9 G/DL (ref 6–8.5)
SL AMB EGFR AFRICAN AMERICAN: 86 ML/MIN/1.73
SL AMB EGFR NON AFRICAN AMERICAN: 74 ML/MIN/1.73
SODIUM SERPL-SCNC: 141 MMOL/L (ref 134–144)

## 2020-08-28 ENCOUNTER — OFFICE VISIT (OUTPATIENT)
Dept: FAMILY MEDICINE CLINIC | Facility: CLINIC | Age: 60
End: 2020-08-28
Payer: COMMERCIAL

## 2020-08-28 VITALS
DIASTOLIC BLOOD PRESSURE: 60 MMHG | HEART RATE: 87 BPM | OXYGEN SATURATION: 96 % | RESPIRATION RATE: 16 BRPM | BODY MASS INDEX: 35.31 KG/M2 | SYSTOLIC BLOOD PRESSURE: 110 MMHG | WEIGHT: 233 LBS | TEMPERATURE: 99 F | HEIGHT: 68 IN

## 2020-08-28 DIAGNOSIS — E89.0 HYPOTHYROIDISM, POSTRADIOIODINE THERAPY: ICD-10-CM

## 2020-08-28 DIAGNOSIS — Z23 NEED FOR VACCINATION: ICD-10-CM

## 2020-08-28 DIAGNOSIS — I10 ESSENTIAL HYPERTENSION: Primary | ICD-10-CM

## 2020-08-28 DIAGNOSIS — R73.01 IMPAIRED FASTING GLUCOSE: ICD-10-CM

## 2020-08-28 DIAGNOSIS — K76.0 FATTY LIVER: ICD-10-CM

## 2020-08-28 DIAGNOSIS — Z12.31 SCREENING MAMMOGRAM, ENCOUNTER FOR: ICD-10-CM

## 2020-08-28 PROCEDURE — 90682 RIV4 VACC RECOMBINANT DNA IM: CPT

## 2020-08-28 PROCEDURE — 90472 IMMUNIZATION ADMIN EACH ADD: CPT

## 2020-08-28 PROCEDURE — 90471 IMMUNIZATION ADMIN: CPT

## 2020-08-28 PROCEDURE — 99214 OFFICE O/P EST MOD 30 MIN: CPT | Performed by: FAMILY MEDICINE

## 2020-08-28 PROCEDURE — 3078F DIAST BP <80 MM HG: CPT | Performed by: FAMILY MEDICINE

## 2020-08-28 PROCEDURE — 3074F SYST BP LT 130 MM HG: CPT | Performed by: FAMILY MEDICINE

## 2020-08-28 PROCEDURE — 3008F BODY MASS INDEX DOCD: CPT | Performed by: FAMILY MEDICINE

## 2020-08-28 PROCEDURE — 90732 PPSV23 VACC 2 YRS+ SUBQ/IM: CPT

## 2020-08-28 PROCEDURE — 1036F TOBACCO NON-USER: CPT | Performed by: FAMILY MEDICINE

## 2020-08-28 RX ORDER — LEVOTHYROXINE SODIUM 150 UG/1
1 CAPSULE ORAL DAILY
Qty: 30 CAPSULE | Refills: 2 | Status: SHIPPED | OUTPATIENT
Start: 2020-08-28 | End: 2020-12-04 | Stop reason: SDUPTHER

## 2020-08-28 NOTE — PROGRESS NOTES
Assessment/Plan:    1  Essential hypertension  Assessment & Plan:  Well controlled  Continue amlodipine 10 milligrams daily and hydrochlorothiazide 12 5 milligrams daily    Orders:  -     CBC; Future; Expected date: 02/09/2021  -     Comprehensive metabolic panel; Future; Expected date: 02/09/2021  -     Lipid Panel with Direct LDL reflex; Future; Expected date: 02/09/2021  -     CBC  -     Comprehensive metabolic panel  -     Lipid Panel with Direct LDL reflex    2  Fatty liver  Assessment & Plan:  Worsening  Advised to stop supplements  Continue Milk Thistle and tumeric     Discussed long term risks of fatty liver disease including increased risk of liver cirrhosis and liver cancer  Advised to stop alcohol intake  Weight loss stressed as the best treatment for improving her liver function      3  Impaired fasting glucose  Assessment & Plan:  Stable  Weight loss discussed  Hemoglobin A1C   Date Value Ref Range Status   07/22/2020 5 9 (H) 4 8 - 5 6 % Final     Comment:              Prediabetes: 5 7 - 6 4           Diabetes: >6 4           Glycemic control for adults with diabetes: <7 0           Orders:  -     Hemoglobin A1C; Future; Expected date: 02/09/2021  -     Hemoglobin A1C    4  Hypothyroidism, postradioiodine therapy  Assessment & Plan:  Stable  Continue tirosint 150 micrograms daily    Orders:  -     Levothyroxine Sodium (Tirosint) 150 MCG CAPS; Take 1 capsule (150 mcg total) by mouth daily  -     TSH, 3rd generation; Future; Expected date: 02/09/2021  -     TSH, 3rd generation    5  Screening mammogram, encounter for  -     Mammo screening bilateral w cad; Future; Expected date: 09/05/2020    6   Need for vaccination  -     influenza vaccine, quadrivalent, recombinant, PF, 0 5 mL, for patients 18 yr+ (FLUBLOK)  -     PNEUMOCOCCAL POLYSACCHARIDE VACCINE 23-VALENT =>1YO SQ IM         Patient Instructions     Recent Results (from the past 672 hour(s))   Comprehensive metabolic panel    Collection Time: 08/20/20  8:04 AM   Result Value Ref Range    Glucose, Random 103 (H) 65 - 99 mg/dL    BUN 20 8 - 27 mg/dL    Creatinine 0 85 0 57 - 1 00 mg/dL    eGFR Non  74 >59 mL/min/1 73    eGFR  86 >59 mL/min/1 73    SL AMB BUN/CREATININE RATIO 24 12 - 28    Sodium 141 134 - 144 mmol/L    Potassium 4 2 3 5 - 5 2 mmol/L    Chloride 102 96 - 106 mmol/L    CO2 25 20 - 29 mmol/L    CALCIUM 10 1 8 7 - 10 3 mg/dL    Protein, Total 6 9 6 0 - 8 5 g/dL    Albumin 4 4 3 8 - 4 8 g/dL    Globulin, Total 2 5 1 5 - 4 5 g/dL    Albumin/Globulin Ratio 1 8 1 2 - 2 2    TOTAL BILIRUBIN 0 4 0 0 - 1 2 mg/dL    Alk Phos Isoenzymes 90 39 - 117 IU/L    AST 33 0 - 40 IU/L    ALT 46 (H) 0 - 32 IU/L     US liver   Status: Final result    PACS Images      Show images for US liver    Study Result     RIGHT UPPER QUADRANT ULTRASOUND     INDICATION:     R74 8: Abnormal levels of other serum enzymes  Elevated LFT's     COMPARISON:  None     TECHNIQUE:   Real-time ultrasound of the right upper quadrant was performed with a curvilinear transducer with both volumetric sweeps and still imaging techniques      FINDINGS:     PANCREAS:  Visualized portions of the pancreas are within normal limits      AORTA AND IVC:  Visualized portions are normal for patient age      LIVER:  Size:  Mildly enlarged  The liver measures 17 7 cm in the midclavicular line  Contour:  Surface contour is smooth  Parenchyma: There is moderate diffuse increased echogenicity with smooth echotexture and acoustic beam attenuation  Most consistent with moderate hepatic steatosis  No evidence of suspicious mass  Limited imaging of the main portal vein shows it to be patent and hepatopetal      BILIARY:  The gallbladder is normal in caliber  No wall thickening or pericholecystic fluid  No stones or sludge identified  No sonographic Preston's sign  No intrahepatic biliary dilatation  CBD measures 6 mm    No choledocholithiasis      KIDNEY:   Right kidney measures 11 4 x 4 0 cm  Within normal limits      ASCITES:   None      IMPRESSION:        1  Moderate diffuse hepatic steatosis with mild hepatomegaly  2   No cholelithiasis, acute cholecystitis or biliary ductal dilation  Return in about 6 months (around 2/28/2021) for Annual physical     Subjective:      Patient ID: Danielito Payton is a 63 y o  female  Chief Complaint   Patient presents with    Follow-up     follow up on labs [id number]        Patient reports that she is feeling well  She is concerned about her liver enzymes continue to be elevated  She has researched now all the supplements that she has been taking  She thinks that some of the may be contributing to her elevated liver enzymes  She has been trying to increase her exercise level as well  She has not been having issues with itching  Hypothyroidism-patient reports she is taking her thyroid medication regularly  She is not having problems with change in energy levels  Hypertension - patient has been taking her blood pressure medication regularly  Associated symptoms:  Swelling:  no  Leg cramps: no      The following portions of the patient's history were reviewed and updated as appropriate:  past social history    Review of Systems   Respiratory: Negative  Cardiovascular: Negative  Current Outpatient Medications   Medication Sig Dispense Refill    amLODIPine (NORVASC) 10 mg tablet TAKE 1 TABLET BY MOUTH DAILY 90 tablet 1    hydrochlorothiazide (MICROZIDE) 12 5 mg capsule TAKE ONE CAPSULE BY MOUTH DAILY 90 capsule 1    Levothyroxine Sodium (Tirosint) 150 MCG CAPS Take 1 capsule (150 mcg total) by mouth daily 30 capsule 2    omeprazole (PriLOSEC) 40 MG capsule TAKE ONE CAPSULE BY MOUTH DAILY 90 capsule 1     No current facility-administered medications for this visit          Objective:    /60   Pulse 87   Temp 99 °F (37 2 °C)   Resp 16   Ht 5' 8" (1 727 m)   Wt 106 kg (233 lb)   LMP 06/29/2015 (Exact Date)   SpO2 96%   BMI 35 43 kg/m²      Physical Exam  Vitals signs and nursing note reviewed  Constitutional:       Appearance: She is well-developed  HENT:      Head: Normocephalic and atraumatic  Right Ear: External ear normal       Left Ear: External ear normal    Cardiovascular:      Rate and Rhythm: Normal rate and regular rhythm  Heart sounds: Normal heart sounds  No murmur  No friction rub  Pulmonary:      Effort: Pulmonary effort is normal  No respiratory distress  Breath sounds: Normal breath sounds  No wheezing or rales  Musculoskeletal:         General: No deformity               Keyona Nez Perce, DO

## 2020-08-28 NOTE — ASSESSMENT & PLAN NOTE
Stable  Weight loss discussed  Hemoglobin A1C   Date Value Ref Range Status   07/22/2020 5 9 (H) 4 8 - 5 6 % Final     Comment:              Prediabetes: 5 7 - 6 4           Diabetes: >6 4           Glycemic control for adults with diabetes: <7 0

## 2020-08-28 NOTE — ASSESSMENT & PLAN NOTE
Worsening  Advised to stop supplements  Continue Milk Thistle and tumeric     Discussed long term risks of fatty liver disease including increased risk of liver cirrhosis and liver cancer    Advised to stop alcohol intake  Weight loss stressed as the best treatment for improving her liver function

## 2020-08-28 NOTE — ASSESSMENT & PLAN NOTE
Well controlled  Continue amlodipine 10 milligrams daily and hydrochlorothiazide 12 5 milligrams daily

## 2020-08-28 NOTE — PATIENT INSTRUCTIONS
Recent Results (from the past 672 hour(s))   Comprehensive metabolic panel    Collection Time: 08/20/20  8:04 AM   Result Value Ref Range    Glucose, Random 103 (H) 65 - 99 mg/dL    BUN 20 8 - 27 mg/dL    Creatinine 0 85 0 57 - 1 00 mg/dL    eGFR Non  74 >59 mL/min/1 73    eGFR  86 >59 mL/min/1 73    SL AMB BUN/CREATININE RATIO 24 12 - 28    Sodium 141 134 - 144 mmol/L    Potassium 4 2 3 5 - 5 2 mmol/L    Chloride 102 96 - 106 mmol/L    CO2 25 20 - 29 mmol/L    CALCIUM 10 1 8 7 - 10 3 mg/dL    Protein, Total 6 9 6 0 - 8 5 g/dL    Albumin 4 4 3 8 - 4 8 g/dL    Globulin, Total 2 5 1 5 - 4 5 g/dL    Albumin/Globulin Ratio 1 8 1 2 - 2 2    TOTAL BILIRUBIN 0 4 0 0 - 1 2 mg/dL    Alk Phos Isoenzymes 90 39 - 117 IU/L    AST 33 0 - 40 IU/L    ALT 46 (H) 0 - 32 IU/L     US liver   Status: Final result    PACS Images      Show images for US liver    Study Result     RIGHT UPPER QUADRANT ULTRASOUND     INDICATION:     R74 8: Abnormal levels of other serum enzymes  Elevated LFT's     COMPARISON:  None     TECHNIQUE:   Real-time ultrasound of the right upper quadrant was performed with a curvilinear transducer with both volumetric sweeps and still imaging techniques      FINDINGS:     PANCREAS:  Visualized portions of the pancreas are within normal limits      AORTA AND IVC:  Visualized portions are normal for patient age      LIVER:  Size:  Mildly enlarged  The liver measures 17 7 cm in the midclavicular line  Contour:  Surface contour is smooth  Parenchyma: There is moderate diffuse increased echogenicity with smooth echotexture and acoustic beam attenuation  Most consistent with moderate hepatic steatosis  No evidence of suspicious mass  Limited imaging of the main portal vein shows it to be patent and hepatopetal      BILIARY:  The gallbladder is normal in caliber  No wall thickening or pericholecystic fluid  No stones or sludge identified  No sonographic Preston's sign    No intrahepatic biliary dilatation  CBD measures 6 mm  No choledocholithiasis      KIDNEY:   Right kidney measures 11 4 x 4 0 cm  Within normal limits      ASCITES:   None      IMPRESSION:        1  Moderate diffuse hepatic steatosis with mild hepatomegaly  2   No cholelithiasis, acute cholecystitis or biliary ductal dilation

## 2020-10-13 DIAGNOSIS — I10 ESSENTIAL HYPERTENSION: ICD-10-CM

## 2020-10-14 RX ORDER — HYDROCHLOROTHIAZIDE 12.5 MG/1
CAPSULE, GELATIN COATED ORAL
Qty: 90 CAPSULE | Refills: 1 | Status: SHIPPED | OUTPATIENT
Start: 2020-10-14 | End: 2021-03-26

## 2020-12-04 ENCOUNTER — TELEPHONE (OUTPATIENT)
Dept: FAMILY MEDICINE CLINIC | Facility: CLINIC | Age: 60
End: 2020-12-04

## 2020-12-04 DIAGNOSIS — E89.0 HYPOTHYROIDISM, POSTRADIOIODINE THERAPY: ICD-10-CM

## 2020-12-04 RX ORDER — LEVOTHYROXINE SODIUM 150 UG/1
1 CAPSULE ORAL DAILY
Qty: 30 CAPSULE | Refills: 2 | Status: SHIPPED | OUTPATIENT
Start: 2020-12-04 | End: 2020-12-07 | Stop reason: SDUPTHER

## 2020-12-07 ENCOUNTER — TELEPHONE (OUTPATIENT)
Dept: FAMILY MEDICINE CLINIC | Facility: CLINIC | Age: 60
End: 2020-12-07

## 2020-12-07 DIAGNOSIS — E89.0 HYPOTHYROIDISM, POSTRADIOIODINE THERAPY: ICD-10-CM

## 2020-12-07 RX ORDER — LEVOTHYROXINE SODIUM 150 UG/1
150 CAPSULE ORAL DAILY
Qty: 90 CAPSULE | Refills: 1 | Status: SHIPPED | OUTPATIENT
Start: 2020-12-07 | End: 2021-02-23

## 2021-01-01 NOTE — TELEPHONE ENCOUNTER
Infant discharge education and follow up information reviewed with parents of infant, both parents express understanding. Copies of all discharge information provided to parents. All questions answered. Infant will be discharged in stable condition, secured in car seat at time of discharge.    Spoke to Kelli Kaplan -she has no problem with her hctz  Has been on this for many years-if ok by you I will call mail away & tell them to fill rx--lj

## 2021-01-12 DIAGNOSIS — I10 ESSENTIAL HYPERTENSION: ICD-10-CM

## 2021-01-12 RX ORDER — AMLODIPINE BESYLATE 10 MG/1
TABLET ORAL
Qty: 90 TABLET | Refills: 1 | Status: SHIPPED | OUTPATIENT
Start: 2021-01-12 | End: 2021-06-25

## 2021-01-22 DIAGNOSIS — K21.9 GERD WITHOUT ESOPHAGITIS: ICD-10-CM

## 2021-01-22 RX ORDER — OMEPRAZOLE 40 MG/1
CAPSULE, DELAYED RELEASE ORAL
Qty: 90 CAPSULE | Refills: 1 | Status: SHIPPED | OUTPATIENT
Start: 2021-01-22 | End: 2021-07-06

## 2021-02-23 DIAGNOSIS — E89.0 HYPOTHYROIDISM, POSTRADIOIODINE THERAPY: ICD-10-CM

## 2021-02-23 RX ORDER — LEVOTHYROXINE SODIUM 150 UG/1
CAPSULE ORAL
Qty: 90 CAPSULE | Refills: 1 | Status: SHIPPED | OUTPATIENT
Start: 2021-02-23 | End: 2021-09-20

## 2021-03-10 DIAGNOSIS — Z23 ENCOUNTER FOR IMMUNIZATION: ICD-10-CM

## 2021-03-22 ENCOUNTER — HOSPITAL ENCOUNTER (OUTPATIENT)
Dept: RADIOLOGY | Facility: HOSPITAL | Age: 61
Discharge: HOME | End: 2021-03-22
Payer: COMMERCIAL

## 2021-03-22 VITALS — BODY MASS INDEX: 35.31 KG/M2 | WEIGHT: 233 LBS | HEIGHT: 68 IN

## 2021-03-22 DIAGNOSIS — Z12.31 SCREENING MAMMOGRAM, ENCOUNTER FOR: ICD-10-CM

## 2021-03-22 PROCEDURE — 77067 SCR MAMMO BI INCL CAD: CPT

## 2021-03-22 PROCEDURE — 77063 BREAST TOMOSYNTHESIS BI: CPT

## 2021-03-26 DIAGNOSIS — I10 ESSENTIAL HYPERTENSION: ICD-10-CM

## 2021-03-26 RX ORDER — HYDROCHLOROTHIAZIDE 12.5 MG/1
CAPSULE, GELATIN COATED ORAL
Qty: 90 CAPSULE | Refills: 0 | Status: SHIPPED | OUTPATIENT
Start: 2021-03-26 | End: 2021-06-15

## 2021-03-26 NOTE — TELEPHONE ENCOUNTER
Patient not seen in office since 8/2020, needs appointment with Dr Amber Gtz  Please call to schedule appointment    Gordo Benoit MA

## 2021-04-18 LAB
ALBUMIN SERPL-MCNC: 4.4 G/DL (ref 3.8–4.8)
ALBUMIN/GLOB SERPL: 1.7 {RATIO} (ref 1.2–2.2)
ALP SERPL-CCNC: 87 IU/L (ref 39–117)
ALT SERPL-CCNC: 42 IU/L (ref 0–32)
AST SERPL-CCNC: 29 IU/L (ref 0–40)
BILIRUB SERPL-MCNC: 0.4 MG/DL (ref 0–1.2)
BUN SERPL-MCNC: 20 MG/DL (ref 8–27)
BUN/CREAT SERPL: 24 (ref 12–28)
CALCIUM SERPL-MCNC: 10.2 MG/DL (ref 8.7–10.3)
CHLORIDE SERPL-SCNC: 104 MMOL/L (ref 96–106)
CHOLEST SERPL-MCNC: 184 MG/DL (ref 100–199)
CO2 SERPL-SCNC: 21 MMOL/L (ref 20–29)
CREAT SERPL-MCNC: 0.85 MG/DL (ref 0.57–1)
ERYTHROCYTE [DISTWIDTH] IN BLOOD BY AUTOMATED COUNT: 12.8 % (ref 11.7–15.4)
EST. AVERAGE GLUCOSE BLD GHB EST-MCNC: 123 MG/DL
GLOBULIN SER-MCNC: 2.6 G/DL (ref 1.5–4.5)
GLUCOSE SERPL-MCNC: 103 MG/DL (ref 65–99)
HBA1C MFR BLD: 5.9 % (ref 4.8–5.6)
HCT VFR BLD AUTO: 42.8 % (ref 34–46.6)
HDLC SERPL-MCNC: 42 MG/DL
HGB BLD-MCNC: 14.6 G/DL (ref 11.1–15.9)
LDLC SERPL CALC-MCNC: 123 MG/DL (ref 0–99)
LDLC/HDLC SERPL: 2.9 RATIO (ref 0–3.2)
MCH RBC QN AUTO: 30.9 PG (ref 26.6–33)
MCHC RBC AUTO-ENTMCNC: 34.1 G/DL (ref 31.5–35.7)
MCV RBC AUTO: 91 FL (ref 79–97)
MICRODELETION SYND BLD/T FISH: NORMAL
PLATELET # BLD AUTO: 222 X10E3/UL (ref 150–450)
POTASSIUM SERPL-SCNC: 4.2 MMOL/L (ref 3.5–5.2)
PROT SERPL-MCNC: 7 G/DL (ref 6–8.5)
RBC # BLD AUTO: 4.72 X10E6/UL (ref 3.77–5.28)
SL AMB EGFR AFRICAN AMERICAN: 85 ML/MIN/1.73
SL AMB EGFR NON AFRICAN AMERICAN: 74 ML/MIN/1.73
SL AMB VLDL CHOLESTEROL CALC: 19 MG/DL (ref 5–40)
SODIUM SERPL-SCNC: 142 MMOL/L (ref 134–144)
TRIGL SERPL-MCNC: 104 MG/DL (ref 0–149)
TSH SERPL DL<=0.005 MIU/L-ACNC: 3.85 UIU/ML (ref 0.45–4.5)
WBC # BLD AUTO: 5.5 X10E3/UL (ref 3.4–10.8)

## 2021-04-21 ENCOUNTER — RA CDI HCC (OUTPATIENT)
Dept: OTHER | Facility: HOSPITAL | Age: 61
End: 2021-04-21

## 2021-04-21 NOTE — PROGRESS NOTES
[facility]  coding opportunities          Chart reviewed, no opportunity found: CHART REVIEWED, NO OPPORTUNITY FOUND              Patients insurance company: [email or web address] (Medicare and Commercial for [employer] and [employer])

## 2021-04-27 ENCOUNTER — OFFICE VISIT (OUTPATIENT)
Dept: FAMILY MEDICINE CLINIC | Facility: CLINIC | Age: 61
End: 2021-04-27
Payer: COMMERCIAL

## 2021-04-27 VITALS
RESPIRATION RATE: 16 BRPM | TEMPERATURE: 98.4 F | WEIGHT: 234 LBS | HEIGHT: 68 IN | SYSTOLIC BLOOD PRESSURE: 128 MMHG | DIASTOLIC BLOOD PRESSURE: 72 MMHG | HEART RATE: 84 BPM | BODY MASS INDEX: 35.46 KG/M2

## 2021-04-27 DIAGNOSIS — E66.01 SEVERE OBESITY (BMI 35.0-39.9) WITH COMORBIDITY (HCC): ICD-10-CM

## 2021-04-27 DIAGNOSIS — R73.01 IMPAIRED FASTING GLUCOSE: ICD-10-CM

## 2021-04-27 DIAGNOSIS — K76.0 FATTY LIVER: ICD-10-CM

## 2021-04-27 DIAGNOSIS — I10 ESSENTIAL HYPERTENSION: Primary | ICD-10-CM

## 2021-04-27 DIAGNOSIS — E89.0 HYPOTHYROIDISM, POSTRADIOIODINE THERAPY: ICD-10-CM

## 2021-04-27 PROCEDURE — 3078F DIAST BP <80 MM HG: CPT | Performed by: FAMILY MEDICINE

## 2021-04-27 PROCEDURE — 1036F TOBACCO NON-USER: CPT | Performed by: FAMILY MEDICINE

## 2021-04-27 PROCEDURE — 3725F SCREEN DEPRESSION PERFORMED: CPT | Performed by: FAMILY MEDICINE

## 2021-04-27 PROCEDURE — 3074F SYST BP LT 130 MM HG: CPT | Performed by: FAMILY MEDICINE

## 2021-04-27 PROCEDURE — 99214 OFFICE O/P EST MOD 30 MIN: CPT | Performed by: FAMILY MEDICINE

## 2021-04-27 PROCEDURE — 3008F BODY MASS INDEX DOCD: CPT | Performed by: FAMILY MEDICINE

## 2021-04-27 NOTE — PROGRESS NOTES
Assessment/Plan:    1  Essential hypertension  Assessment & Plan:  Well controlled     Orders:  -     CBC; Future; Expected date: 10/09/2021  -     Comprehensive metabolic panel; Future; Expected date: 10/09/2021  -     Lipid Panel with Direct LDL reflex; Future; Expected date: 10/09/2021  -     CBC  -     Comprehensive metabolic panel  -     Lipid Panel with Direct LDL reflex    2  Impaired fasting glucose  Assessment & Plan:  Stable - still at risk of developing diabetes    Hemoglobin A1C   Date Value Ref Range Status   04/17/2021 5 9 (H) 4 8 - 5 6 % Final     Comment:              Prediabetes: 5 7 - 6 4         Diabetes: >6 4         Glycemic   control for adults with diabetes: <7 0           Orders:  -     Hemoglobin A1C; Future; Expected date: 10/09/2021  -     Hemoglobin A1C    3  Fatty liver  Assessment & Plan:  ALT is still elevated    Orders:  -     Comprehensive metabolic panel; Future; Expected date: 10/09/2021  -     Comprehensive metabolic panel    4  Severe obesity (BMI 35 0-39  9) with comorbidity ([facility] [address] )  Assessment & Plan:  unchanged      5  Hypothyroidism, postradioiodine therapy  Assessment & Plan:  Stable  Continue Tirosint 150 mcg daily    Orders:  -     TSH, 3rd generation; Future; Expected date: 10/09/2021  -     T4, free; Future; Expected date: 10/09/2021  -     TSH, 3rd generation  -     T4, free    BMI Counseling: Body mass index is 35 58 kg/m²  The BMI is above normal  Nutrition recommendations include moderation in carbohydrate intake  she has had her COVID vaccine     There are no Patient Instructions on file for this visit  Return in about 6 months (around 10/27/2021) for Annual physical with pap smear   Subjective:      Patient ID: Keyon Marques is a 57 y o  female  Chief Complaint   Patient presents with    Follow-up     review labs ss,lpn        She is thinking of moving to [place]  Hypertension - patient has been taking her blood pressure medication regularly  Associated symptoms:  Swelling:  no  Leg cramps: no      The following portions of the patient's history were reviewed and updated as appropriate:  past social history    Review of Systems   Respiratory: Negative  Cardiovascular: Negative  Current Outpatient Medications   Medication Sig Dispense Refill    amLODIPine (NORVASC) 10 mg tablet TAKE 1 TABLET BY MOUTH DAILY 90 tablet 1    hydrochlorothiazide (MICROZIDE) 12 5 mg capsule TAKE ONE CAPSULE BY MOUTH DAILY 90 capsule 0    omeprazole (PriLOSEC) 40 MG capsule TAKE ONE CAPSULE BY MOUTH DAILY 90 capsule 1    Tirosint 150 MCG CAPS TAKE 1 CAPSULE BY MOUTH DAILY 90 capsule 1     No current facility-administered medications for this visit  Objective:    /72   Pulse 84   Temp 98 4 °F (36 9 °C)   Resp 16   Ht 5' 8" (1 727 m)   Wt 106 kg (234 lb)   LMP 06/29/2015 (Exact Date)   BMI 35 58 kg/m²      Physical Exam  Vitals signs and nursing note reviewed  Constitutional:       Appearance: She is well-developed  HENT:      Head: Normocephalic and atraumatic  Right Ear: Tympanic membrane and external ear normal       Left Ear: Tympanic membrane and external ear normal    Cardiovascular:      Rate and Rhythm: Normal rate and regular rhythm  Heart sounds: Normal heart sounds  No murmur  No friction rub  Pulmonary:      Effort: Pulmonary effort is normal  No respiratory distress  Breath sounds: Normal breath sounds  No wheezing or rales  Musculoskeletal:      Right lower leg: No edema  Left lower leg: No edema               Darlene Amador DO

## 2021-04-28 NOTE — ASSESSMENT & PLAN NOTE
Stable - still at risk of developing diabetes    Hemoglobin A1C   Date Value Ref Range Status   04/17/2021 5 9 (H) 4 8 - 5 6 % Final     Comment:              Prediabetes: 5 7 - 6 4         Diabetes: >6 4         Glycemic   control for adults with diabetes: <7 0

## 2021-06-15 DIAGNOSIS — I10 ESSENTIAL HYPERTENSION: ICD-10-CM

## 2021-06-15 RX ORDER — HYDROCHLOROTHIAZIDE 12.5 MG/1
CAPSULE, GELATIN COATED ORAL
Qty: 90 CAPSULE | Refills: 0 | Status: SHIPPED | OUTPATIENT
Start: 2021-06-15 | End: 2021-10-11

## 2021-06-25 DIAGNOSIS — I10 ESSENTIAL HYPERTENSION: ICD-10-CM

## 2021-06-25 RX ORDER — AMLODIPINE BESYLATE 10 MG/1
TABLET ORAL
Qty: 90 TABLET | Refills: 1 | Status: SHIPPED | OUTPATIENT
Start: 2021-06-25 | End: 2022-01-03

## 2021-07-05 DIAGNOSIS — K21.9 GERD WITHOUT ESOPHAGITIS: ICD-10-CM

## 2021-07-06 RX ORDER — OMEPRAZOLE 40 MG/1
CAPSULE, DELAYED RELEASE ORAL
Qty: 90 CAPSULE | Refills: 1 | Status: SHIPPED | OUTPATIENT
Start: 2021-07-06

## 2021-09-09 ENCOUNTER — TELEPHONE (OUTPATIENT)
Dept: FAMILY MEDICINE CLINIC | Facility: CLINIC | Age: 61
End: 2021-09-09

## 2021-09-09 NOTE — TELEPHONE ENCOUNTER
Prior Auth done on Cover [employer] Meds Key [id number]    Forms completed [facility] aware other medications gave pt stomach issues N/V diarrhea and headaches     PA approved until 09/07/2022 Pt and pharmacy aware  NFA  driss

## 2021-09-20 DIAGNOSIS — E89.0 HYPOTHYROIDISM, POSTRADIOIODINE THERAPY: ICD-10-CM

## 2021-09-20 RX ORDER — LEVOTHYROXINE SODIUM 150 UG/1
CAPSULE ORAL
Qty: 90 CAPSULE | Refills: 1 | Status: SHIPPED | OUTPATIENT
Start: 2021-09-20 | End: 2021-10-29 | Stop reason: ALTCHOICE

## 2021-10-07 ENCOUNTER — RA CDI HCC (OUTPATIENT)
Dept: OTHER | Facility: HOSPITAL | Age: 61
End: 2021-10-07

## 2021-10-11 DIAGNOSIS — I10 ESSENTIAL HYPERTENSION: ICD-10-CM

## 2021-10-11 LAB
ALBUMIN SERPL-MCNC: 4.7 G/DL (ref 3.8–4.8)
ALBUMIN/GLOB SERPL: 1.7 {RATIO} (ref 1.2–2.2)
ALP SERPL-CCNC: 99 IU/L (ref 44–121)
ALT SERPL-CCNC: 43 IU/L (ref 0–32)
AST SERPL-CCNC: 29 IU/L (ref 0–40)
BILIRUB SERPL-MCNC: 0.4 MG/DL (ref 0–1.2)
BUN SERPL-MCNC: 19 MG/DL (ref 8–27)
BUN/CREAT SERPL: 20 (ref 12–28)
CALCIUM SERPL-MCNC: 10.4 MG/DL (ref 8.7–10.3)
CHLORIDE SERPL-SCNC: 100 MMOL/L (ref 96–106)
CHOLEST SERPL-MCNC: 211 MG/DL (ref 100–199)
CO2 SERPL-SCNC: 27 MMOL/L (ref 20–29)
CREAT SERPL-MCNC: 0.96 MG/DL (ref 0.57–1)
ERYTHROCYTE [DISTWIDTH] IN BLOOD BY AUTOMATED COUNT: 13 % (ref 11.7–15.4)
EST. AVERAGE GLUCOSE BLD GHB EST-MCNC: 128 MG/DL
GLOBULIN SER-MCNC: 2.7 G/DL (ref 1.5–4.5)
GLUCOSE SERPL-MCNC: 111 MG/DL (ref 65–99)
HBA1C MFR BLD: 6.1 % (ref 4.8–5.6)
HCT VFR BLD AUTO: 44.4 % (ref 34–46.6)
HDLC SERPL-MCNC: 49 MG/DL
HGB BLD-MCNC: 15 G/DL (ref 11.1–15.9)
LDLC SERPL CALC-MCNC: 138 MG/DL (ref 0–99)
LDLC/HDLC SERPL: 2.8 RATIO (ref 0–3.2)
MCH RBC QN AUTO: 30.9 PG (ref 26.6–33)
MCHC RBC AUTO-ENTMCNC: 33.8 G/DL (ref 31.5–35.7)
MCV RBC AUTO: 91 FL (ref 79–97)
MICRODELETION SYND BLD/T FISH: NORMAL
PLATELET # BLD AUTO: 228 X10E3/UL (ref 150–450)
POTASSIUM SERPL-SCNC: 4.1 MMOL/L (ref 3.5–5.2)
PROT SERPL-MCNC: 7.4 G/DL (ref 6–8.5)
RBC # BLD AUTO: 4.86 X10E6/UL (ref 3.77–5.28)
SL AMB EGFR AFRICAN AMERICAN: 73 ML/MIN/1.73
SL AMB EGFR NON AFRICAN AMERICAN: 64 ML/MIN/1.73
SL AMB VLDL CHOLESTEROL CALC: 24 MG/DL (ref 5–40)
SODIUM SERPL-SCNC: 139 MMOL/L (ref 134–144)
T4 FREE SERPL-MCNC: 1.11 NG/DL (ref 0.82–1.77)
TRIGL SERPL-MCNC: 135 MG/DL (ref 0–149)
TSH SERPL DL<=0.005 MIU/L-ACNC: 11.6 UIU/ML (ref 0.45–4.5)
WBC # BLD AUTO: 7.3 X10E3/UL (ref 3.4–10.8)

## 2021-10-11 RX ORDER — HYDROCHLOROTHIAZIDE 12.5 MG/1
CAPSULE, GELATIN COATED ORAL
Qty: 90 CAPSULE | Refills: 1 | Status: SHIPPED | OUTPATIENT
Start: 2021-10-11 | End: 2022-06-10 | Stop reason: SDUPTHER

## 2021-10-13 ENCOUNTER — TELEPHONE (OUTPATIENT)
Dept: FAMILY MEDICINE CLINIC | Facility: CLINIC | Age: 61
End: 2021-10-13

## 2021-10-29 ENCOUNTER — OFFICE VISIT (OUTPATIENT)
Dept: FAMILY MEDICINE CLINIC | Facility: CLINIC | Age: 61
End: 2021-10-29
Payer: COMMERCIAL

## 2021-10-29 VITALS
BODY MASS INDEX: 34.25 KG/M2 | WEIGHT: 226 LBS | HEART RATE: 91 BPM | DIASTOLIC BLOOD PRESSURE: 76 MMHG | SYSTOLIC BLOOD PRESSURE: 124 MMHG | TEMPERATURE: 97.6 F | RESPIRATION RATE: 16 BRPM | OXYGEN SATURATION: 98 % | HEIGHT: 68 IN

## 2021-10-29 DIAGNOSIS — E89.0 HYPOTHYROIDISM, POSTRADIOIODINE THERAPY: ICD-10-CM

## 2021-10-29 DIAGNOSIS — Z23 NEED FOR INFLUENZA VACCINATION: ICD-10-CM

## 2021-10-29 DIAGNOSIS — Z00.00 ANNUAL PHYSICAL EXAM: Primary | ICD-10-CM

## 2021-10-29 DIAGNOSIS — E83.52 HYPERCALCEMIA: ICD-10-CM

## 2021-10-29 DIAGNOSIS — I10 ESSENTIAL HYPERTENSION: ICD-10-CM

## 2021-10-29 DIAGNOSIS — Z12.4 CERVICAL CANCER SCREENING: ICD-10-CM

## 2021-10-29 DIAGNOSIS — R73.01 IMPAIRED FASTING GLUCOSE: ICD-10-CM

## 2021-10-29 DIAGNOSIS — Z12.11 COLON CANCER SCREENING: ICD-10-CM

## 2021-10-29 LAB — SL AMB POCT FECES OCC BLD: NEGATIVE

## 2021-10-29 PROCEDURE — 1036F TOBACCO NON-USER: CPT | Performed by: FAMILY MEDICINE

## 2021-10-29 PROCEDURE — 82270 OCCULT BLOOD FECES: CPT | Performed by: FAMILY MEDICINE

## 2021-10-29 PROCEDURE — 90471 IMMUNIZATION ADMIN: CPT

## 2021-10-29 PROCEDURE — 99396 PREV VISIT EST AGE 40-64: CPT | Performed by: FAMILY MEDICINE

## 2021-10-29 PROCEDURE — 3008F BODY MASS INDEX DOCD: CPT | Performed by: FAMILY MEDICINE

## 2021-10-29 PROCEDURE — 90682 RIV4 VACC RECOMBINANT DNA IM: CPT

## 2021-10-29 RX ORDER — LEVOTHYROXINE SODIUM 175 UG/1
175 CAPSULE ORAL DAILY
Qty: 90 CAPSULE | Refills: 1
Start: 2021-10-29 | End: 2021-11-01 | Stop reason: SDUPTHER

## 2021-10-29 RX ORDER — LEVOTHYROXINE SODIUM 25 UG/1
25 CAPSULE ORAL DAILY
Qty: 90 CAPSULE | Refills: 0 | Status: SHIPPED | OUTPATIENT
Start: 2021-10-29 | End: 2021-11-01 | Stop reason: SDUPTHER

## 2021-11-01 ENCOUNTER — TELEPHONE (OUTPATIENT)
Dept: FAMILY MEDICINE CLINIC | Facility: CLINIC | Age: 61
End: 2021-11-01

## 2021-11-01 DIAGNOSIS — E89.0 HYPOTHYROIDISM, POSTRADIOIODINE THERAPY: ICD-10-CM

## 2021-11-01 RX ORDER — LEVOTHYROXINE SODIUM 25 UG/1
25 CAPSULE ORAL DAILY
Qty: 90 CAPSULE | Refills: 0 | Status: SHIPPED | OUTPATIENT
Start: 2021-11-01 | End: 2022-06-10

## 2021-11-01 RX ORDER — LEVOTHYROXINE SODIUM 175 UG/1
175 CAPSULE ORAL DAILY
Qty: 90 CAPSULE | Refills: 1 | Status: SHIPPED | OUTPATIENT
Start: 2021-11-01 | End: 2022-04-05

## 2021-11-04 LAB
CYTOLOGIST CVX/VAG CYTO: NORMAL
DX ICD CODE: NORMAL
HPV I/H RISK 4 DNA CVX QL PROBE+SIG AMP: NEGATIVE
OTHER STN SPEC: NORMAL
PATH REPORT.FINAL DX SPEC: NORMAL
SL AMB NOTE:: NORMAL
SL AMB SPECIMEN ADEQUACY: NORMAL
SL AMB TEST METHODOLOGY: NORMAL

## 2021-12-22 LAB
ALBUMIN SERPL-MCNC: 4.4 G/DL (ref 3.8–4.8)
ALBUMIN/GLOB SERPL: 1.7 {RATIO} (ref 1.2–2.2)
ALP SERPL-CCNC: 91 IU/L (ref 44–121)
ALT SERPL-CCNC: 33 IU/L (ref 0–32)
AST SERPL-CCNC: 23 IU/L (ref 0–40)
BILIRUB SERPL-MCNC: 0.4 MG/DL (ref 0–1.2)
BUN SERPL-MCNC: 22 MG/DL (ref 8–27)
BUN/CREAT SERPL: 27 (ref 12–28)
CALCIUM SERPL-MCNC: 10.2 MG/DL (ref 8.7–10.3)
CHLORIDE SERPL-SCNC: 103 MMOL/L (ref 96–106)
CHOLEST SERPL-MCNC: 170 MG/DL (ref 100–199)
CO2 SERPL-SCNC: 26 MMOL/L (ref 20–29)
CREAT SERPL-MCNC: 0.81 MG/DL (ref 0.57–1)
ERYTHROCYTE [DISTWIDTH] IN BLOOD BY AUTOMATED COUNT: 12.7 % (ref 11.7–15.4)
EST. AVERAGE GLUCOSE BLD GHB EST-MCNC: 123 MG/DL
GLOBULIN SER-MCNC: 2.6 G/DL (ref 1.5–4.5)
GLUCOSE SERPL-MCNC: 102 MG/DL (ref 65–99)
HBA1C MFR BLD: 5.9 % (ref 4.8–5.6)
HCT VFR BLD AUTO: 41.2 % (ref 34–46.6)
HDLC SERPL-MCNC: 43 MG/DL
HGB BLD-MCNC: 14 G/DL (ref 11.1–15.9)
LDLC SERPL CALC-MCNC: 112 MG/DL (ref 0–99)
LDLC/HDLC SERPL: 2.6 RATIO (ref 0–3.2)
MCH RBC QN AUTO: 30.8 PG (ref 26.6–33)
MCHC RBC AUTO-ENTMCNC: 34 G/DL (ref 31.5–35.7)
MCV RBC AUTO: 91 FL (ref 79–97)
MICRODELETION SYND BLD/T FISH: NORMAL
PLATELET # BLD AUTO: 215 X10E3/UL (ref 150–450)
POTASSIUM SERPL-SCNC: 3.9 MMOL/L (ref 3.5–5.2)
PROT SERPL-MCNC: 7 G/DL (ref 6–8.5)
RBC # BLD AUTO: 4.55 X10E6/UL (ref 3.77–5.28)
SL AMB EGFR AFRICAN AMERICAN: 90 ML/MIN/1.73
SL AMB EGFR NON AFRICAN AMERICAN: 78 ML/MIN/1.73
SL AMB VLDL CHOLESTEROL CALC: 15 MG/DL (ref 5–40)
SODIUM SERPL-SCNC: 142 MMOL/L (ref 134–144)
T4 FREE SERPL-MCNC: 1.71 NG/DL (ref 0.82–1.77)
TRIGL SERPL-MCNC: 80 MG/DL (ref 0–149)
TSH SERPL DL<=0.005 MIU/L-ACNC: 1.19 UIU/ML (ref 0.45–4.5)
WBC # BLD AUTO: 6 X10E3/UL (ref 3.4–10.8)

## 2021-12-31 DIAGNOSIS — I10 ESSENTIAL HYPERTENSION: ICD-10-CM

## 2022-01-03 RX ORDER — AMLODIPINE BESYLATE 10 MG/1
TABLET ORAL
Qty: 90 TABLET | Refills: 1 | Status: SHIPPED | OUTPATIENT
Start: 2022-01-03 | End: 2022-06-10 | Stop reason: SDUPTHER

## 2022-03-21 ENCOUNTER — TELEPHONE (OUTPATIENT)
Dept: FAMILY MEDICINE CLINIC | Facility: CLINIC | Age: 62
End: 2022-03-21

## 2022-03-21 ENCOUNTER — OFFICE VISIT (OUTPATIENT)
Dept: FAMILY MEDICINE CLINIC | Facility: CLINIC | Age: 62
End: 2022-03-21
Payer: COMMERCIAL

## 2022-03-21 VITALS
WEIGHT: 225 LBS | HEART RATE: 82 BPM | HEIGHT: 68 IN | DIASTOLIC BLOOD PRESSURE: 76 MMHG | TEMPERATURE: 99.9 F | BODY MASS INDEX: 34.1 KG/M2 | SYSTOLIC BLOOD PRESSURE: 132 MMHG | RESPIRATION RATE: 16 BRPM

## 2022-03-21 DIAGNOSIS — E89.0 HYPOTHYROIDISM, POSTRADIOIODINE THERAPY: ICD-10-CM

## 2022-03-21 DIAGNOSIS — R73.01 IMPAIRED FASTING GLUCOSE: ICD-10-CM

## 2022-03-21 DIAGNOSIS — I10 ESSENTIAL HYPERTENSION: ICD-10-CM

## 2022-03-21 DIAGNOSIS — H66.90 ACUTE OTITIS MEDIA, UNSPECIFIED OTITIS MEDIA TYPE: Primary | ICD-10-CM

## 2022-03-21 PROCEDURE — 1036F TOBACCO NON-USER: CPT | Performed by: FAMILY MEDICINE

## 2022-03-21 PROCEDURE — 3008F BODY MASS INDEX DOCD: CPT | Performed by: FAMILY MEDICINE

## 2022-03-21 PROCEDURE — 99214 OFFICE O/P EST MOD 30 MIN: CPT | Performed by: FAMILY MEDICINE

## 2022-03-21 PROCEDURE — 3725F SCREEN DEPRESSION PERFORMED: CPT | Performed by: FAMILY MEDICINE

## 2022-03-21 RX ORDER — AZITHROMYCIN 250 MG/1
TABLET, FILM COATED ORAL
Qty: 6 TABLET | Refills: 0 | Status: SHIPPED | OUTPATIENT
Start: 2022-03-21 | End: 2022-03-26

## 2022-03-21 RX ORDER — FAMOTIDINE 20 MG/1
20 TABLET, FILM COATED ORAL 2 TIMES DAILY
COMMUNITY

## 2022-03-21 NOTE — TELEPHONE ENCOUNTER
Patient stated that she experience symptoms of dizziness, and hot flashes  Patient stated that at work she went to the nurses office and they checked her BP and it was 144/88  The nurses had told the patient that they wanted to call the ambulance for her but she refused  I asked how she was feeling and she stated fine at the moment I offered an appt at 2 pm with Dr Janett Salinas and she agreed to come in  Patient was told that if symptoms persisted to go to the ER    Lawson Iniguez MA

## 2022-03-21 NOTE — TELEPHONE ENCOUNTER
Patient was sent home from work with high blood pressure 148-88    Symptoms dizziness, waves of hot flashes

## 2022-03-21 NOTE — ASSESSMENT & PLAN NOTE
Blood pressure is well controlled  Will have her continue hydrochlorothiazide 12 5 mg daily and amlodipine 10 mg daily

## 2022-03-21 NOTE — LETTER
March 21, 2022     Patient: Jg Francis   YOB: 1960   Date of Visit: 3/21/2022       To Whom it May Concern:    Jg Francis is under my professional care  She was seen in my office on 3/21/2022  If you have any questions or concerns, please don't hesitate to call           Sincerely,          Natalya Echols,         CC: No Recipients

## 2022-03-21 NOTE — PROGRESS NOTES
Assessment/Plan:    1  Acute otitis media, unspecified otitis media type  Comments:  New  Orders:  -     azithromycin (ZITHROMAX) 250 mg tablet; Take 2 the first day, then take 1 daily    2  Essential hypertension  Assessment & Plan:  Blood pressure is well controlled  Will have her continue hydrochlorothiazide 12 5 mg daily and amlodipine 10 mg daily    Orders:  -     CBC; Future  -     Comprehensive metabolic panel; Future  -     Lipid Panel with Direct LDL reflex; Future  -     CBC  -     Comprehensive metabolic panel  -     Lipid Panel with Direct LDL reflex    3  Hypothyroidism, postradioiodine therapy  Assessment & Plan:  Stable   Will do labs again in June   Continue to her sent    Orders:  -     TSH, 3rd generation; Future  -     T4, free; Future  -     TSH, 3rd generation  -     T4, free    4  Impaired fasting glucose  -     Hemoglobin A1C; Future  -     Hemoglobin A1C         There are no Patient Instructions on file for this visit  Return if symptoms worsen or fail to improve, for Next scheduled follow up in June  Subjective:      Patient ID: Leslie Lyons is a 60 y o  female  Chief Complaint   Patient presents with    Follow-up     medication ac/lpn    Blood Pressure Check    Dizziness     sent home from work today    Earache     right ear       She was walking at work at felt dizzy  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 was mild elevated  She has been having balance issues that are happening intermittently  The following portions of the patient's history were reviewed and updated as appropriate:  past social history    Review of Systems   HENT: Positive for ear pain  Facial swelling: right, started 2 days ago            Current Outpatient Medications   Medication Sig Dispense Refill    amLODIPine (NORVASC) 10 mg tablet TAKE 1 TABLET BY MOUTH DAILY 90 tablet 1    famotidine (PEPCID) 20 mg tablet Take 20 mg by mouth 2 (two) times a day      hydrochlorothiazide (MICROZIDE) 12 5 mg capsule TAKE ONE CAPSULE BY MOUTH DAILY 90 capsule 1    omeprazole (PriLOSEC) 40 MG capsule TAKE ONE CAPSULE BY MOUTH DAILY (Patient taking differently: 3 times a week ) 90 capsule 1    Tirosint 175 MCG CAPS Take 175 mcg by mouth daily 90 capsule 1    azithromycin (ZITHROMAX) 250 mg tablet Take 2 the first day, then take 1 daily 6 tablet 0    Tirosint 25 MCG CAPS Take 25 mcg by mouth daily Take with 150 to equal 175; name brand medically necessary 90 capsule 0     No current facility-administered medications for this visit  Objective:    /76   Pulse 82   Temp 99 9 °F (37 7 °C)   Resp 16   Ht 5' 8" (1 727 m)   Wt 102 kg (225 lb)   LMP 06/29/2015 (Exact Date)   BMI 34 21 kg/m²      Physical Exam  Vitals and nursing note reviewed  Constitutional:       Appearance: She is well-developed  HENT:      Head: Normocephalic and atraumatic  Right Ear: External ear normal       Left Ear: External ear normal       Ears:      Comments: Bilateral tympanic infusions with erythema and bulging on the right  Eyes:      Extraocular Movements: Extraocular movements intact  Pupils: Pupils are equal, round, and reactive to light  Cardiovascular:      Rate and Rhythm: Normal rate and regular rhythm  Heart sounds: Normal heart sounds  No murmur heard  No friction rub  Pulmonary:      Effort: Pulmonary effort is normal  No respiratory distress  Breath sounds: Normal breath sounds  No wheezing or rales  Musculoskeletal:      Right lower leg: No edema  Left lower leg: No edema               Savita Lunsford, DO

## 2022-04-05 DIAGNOSIS — E89.0 HYPOTHYROIDISM, POSTRADIOIODINE THERAPY: ICD-10-CM

## 2022-04-05 RX ORDER — LEVOTHYROXINE SODIUM 175 UG/1
CAPSULE ORAL
Qty: 90 CAPSULE | Refills: 1 | Status: SHIPPED | OUTPATIENT
Start: 2022-04-05

## 2022-05-31 ENCOUNTER — RA CDI HCC (OUTPATIENT)
Dept: OTHER | Facility: HOSPITAL | Age: 62
End: 2022-05-31

## 2022-05-31 NOTE — PROGRESS NOTES
[facility]  coding opportunities       Chart reviewed, no opportunity found: CHART REVIEWED, NO OPPORTUNITY FOUND        Patients Insurance        Commercial Insurance: Jerry Dorsey

## 2022-06-04 LAB
ALBUMIN SERPL-MCNC: 4.6 G/DL (ref 3.8–4.8)
ALBUMIN/GLOB SERPL: 1.8 {RATIO} (ref 1.2–2.2)
ALP SERPL-CCNC: 94 IU/L (ref 44–121)
ALT SERPL-CCNC: 38 IU/L (ref 0–32)
AST SERPL-CCNC: 26 IU/L (ref 0–40)
BILIRUB SERPL-MCNC: 0.5 MG/DL (ref 0–1.2)
BUN SERPL-MCNC: 22 MG/DL (ref 8–27)
BUN/CREAT SERPL: 25 (ref 12–28)
CALCIUM SERPL-MCNC: 10.1 MG/DL (ref 8.7–10.3)
CHLORIDE SERPL-SCNC: 102 MMOL/L (ref 96–106)
CHOLEST SERPL-MCNC: 188 MG/DL (ref 100–199)
CO2 SERPL-SCNC: 24 MMOL/L (ref 20–29)
CREAT SERPL-MCNC: 0.87 MG/DL (ref 0.57–1)
EGFR: 75 ML/MIN/1.73
ERYTHROCYTE [DISTWIDTH] IN BLOOD BY AUTOMATED COUNT: 12.7 % (ref 11.7–15.4)
EST. AVERAGE GLUCOSE BLD GHB EST-MCNC: 131 MG/DL
GLOBULIN SER-MCNC: 2.6 G/DL (ref 1.5–4.5)
GLUCOSE SERPL-MCNC: 107 MG/DL (ref 65–99)
HBA1C MFR BLD: 6.2 % (ref 4.8–5.6)
HCT VFR BLD AUTO: 43.7 % (ref 34–46.6)
HDLC SERPL-MCNC: 41 MG/DL
HGB BLD-MCNC: 14.5 G/DL (ref 11.1–15.9)
LDLC SERPL CALC-MCNC: 124 MG/DL (ref 0–99)
LDLC/HDLC SERPL: 3 RATIO (ref 0–3.2)
MCH RBC QN AUTO: 29.8 PG (ref 26.6–33)
MCHC RBC AUTO-ENTMCNC: 33.2 G/DL (ref 31.5–35.7)
MCV RBC AUTO: 90 FL (ref 79–97)
MICRODELETION SYND BLD/T FISH: NORMAL
PLATELET # BLD AUTO: 221 X10E3/UL (ref 150–450)
POTASSIUM SERPL-SCNC: 4.3 MMOL/L (ref 3.5–5.2)
PROT SERPL-MCNC: 7.2 G/DL (ref 6–8.5)
RBC # BLD AUTO: 4.86 X10E6/UL (ref 3.77–5.28)
SL AMB VLDL CHOLESTEROL CALC: 23 MG/DL (ref 5–40)
SODIUM SERPL-SCNC: 141 MMOL/L (ref 134–144)
T4 FREE SERPL-MCNC: 1.58 NG/DL (ref 0.82–1.77)
TRIGL SERPL-MCNC: 129 MG/DL (ref 0–149)
TSH SERPL DL<=0.005 MIU/L-ACNC: 1.66 UIU/ML (ref 0.45–4.5)
WBC # BLD AUTO: 5.4 X10E3/UL (ref 3.4–10.8)

## 2022-06-10 ENCOUNTER — OFFICE VISIT (OUTPATIENT)
Dept: FAMILY MEDICINE CLINIC | Facility: CLINIC | Age: 62
End: 2022-06-10
Payer: COMMERCIAL

## 2022-06-10 VITALS
TEMPERATURE: 97.4 F | OXYGEN SATURATION: 99 % | WEIGHT: 223 LBS | DIASTOLIC BLOOD PRESSURE: 80 MMHG | HEART RATE: 81 BPM | RESPIRATION RATE: 18 BRPM | SYSTOLIC BLOOD PRESSURE: 134 MMHG | BODY MASS INDEX: 33.8 KG/M2 | HEIGHT: 68 IN

## 2022-06-10 DIAGNOSIS — E66.01 SEVERE OBESITY (BMI 35.0-39.9) WITH COMORBIDITY (HCC): ICD-10-CM

## 2022-06-10 DIAGNOSIS — M79.672 CHRONIC PAIN IN LEFT FOOT: Primary | ICD-10-CM

## 2022-06-10 DIAGNOSIS — E89.0 HYPOTHYROIDISM, POSTRADIOIODINE THERAPY: ICD-10-CM

## 2022-06-10 DIAGNOSIS — K76.0 FATTY LIVER: ICD-10-CM

## 2022-06-10 DIAGNOSIS — R73.01 IMPAIRED FASTING GLUCOSE: ICD-10-CM

## 2022-06-10 DIAGNOSIS — G89.29 CHRONIC PAIN IN LEFT FOOT: Primary | ICD-10-CM

## 2022-06-10 DIAGNOSIS — I10 ESSENTIAL HYPERTENSION: ICD-10-CM

## 2022-06-10 DIAGNOSIS — Z12.31 SCREENING MAMMOGRAM, ENCOUNTER FOR: ICD-10-CM

## 2022-06-10 PROCEDURE — 3725F SCREEN DEPRESSION PERFORMED: CPT | Performed by: FAMILY MEDICINE

## 2022-06-10 PROCEDURE — 3008F BODY MASS INDEX DOCD: CPT | Performed by: FAMILY MEDICINE

## 2022-06-10 PROCEDURE — 99214 OFFICE O/P EST MOD 30 MIN: CPT | Performed by: FAMILY MEDICINE

## 2022-06-10 PROCEDURE — 1036F TOBACCO NON-USER: CPT | Performed by: FAMILY MEDICINE

## 2022-06-10 RX ORDER — AMLODIPINE BESYLATE 10 MG/1
10 TABLET ORAL DAILY
Qty: 90 TABLET | Refills: 3 | Status: SHIPPED | OUTPATIENT
Start: 2022-06-10

## 2022-06-10 RX ORDER — HYDROCHLOROTHIAZIDE 12.5 MG/1
12.5 CAPSULE, GELATIN COATED ORAL DAILY
Qty: 90 CAPSULE | Refills: 3 | Status: SHIPPED | OUTPATIENT
Start: 2022-06-10

## 2022-06-10 NOTE — PROGRESS NOTES
Assessment/Plan:    1  Chronic pain in left foot  Assessment & Plan:  Ongoing foot pain  Handicap placard form completed       2  Essential hypertension  Assessment & Plan:  Well controlled  Continue amlodipine 10 mg daily and hydrochlorothiazide 12 5 mg daily    Orders:  -     amLODIPine (NORVASC) 10 mg tablet; Take 1 tablet (10 mg total) by mouth daily  -     hydrochlorothiazide (MICROZIDE) 12 5 mg capsule; Take 1 capsule (12 5 mg total) by mouth daily  -     CBC; Future; Expected date: 11/22/2022  -     Comprehensive metabolic panel; Future; Expected date: 11/22/2022  -     Lipid Panel with Direct LDL reflex; Future; Expected date: 11/22/2022  -     CBC  -     Comprehensive metabolic panel  -     Lipid Panel with Direct LDL reflex    3  Impaired fasting glucose  Assessment & Plan:  Fasting sugar and hemoglobin A1c remain in the prediabetic range   Patient is going to work on increasing her exercise and she has already started watching the carbs in her diet  Orders:  -     Hemoglobin A1C; Future; Expected date: 11/22/2022  -     Hemoglobin A1C    4  Severe obesity (BMI 35 0-39  9) with comorbidity ([address] )  Assessment & Plan:  improving      5  Hypothyroidism, postradioiodine therapy  Assessment & Plan:  Thyroid levels are at goal  Continue tirosint 200 mcg daily    Orders:  -     TSH, 3rd generation; Future; Expected date: 11/22/2022  -     T4, free; Future; Expected date: 11/22/2022  -     TSH, 3rd generation  -     T4, free    6  Fatty liver  Assessment & Plan:  FIB 4 score is 1 2    Risks of fatty liver disease discussed  Risks of cirrhosis and liver cancer discussed    Lifestyle modification discussed       7  Screening mammogram, encounter for  -     Mammo screening bilateral w 3d & cad; Future; Expected date: 06/11/2022      BMI Counseling: Body mass index is 33 91 kg/m²  The BMI is above normal  Nutrition recommendations include moderation in carbohydrate intake   Rationale for BMI follow-up plan is due to patient being overweight or obese  Depression Screening and Follow-up Plan: Patient was screened for depression during today's encounter  They screened negative with a PHQ-2 score of 0  There are no Patient Instructions on file for this visit  Return in about 6 months (around 12/10/2022)  Subjective:      Patient ID: Ady Dean is a 60 y o  female  Chief Complaint   Patient presents with    Follow-up     Lab work review  rmklpn       She has chronic pain in her left foot  Her arch has dropped and gets numbness and pain up her leg  She also has pain in her hips  She has been trying to lose weight  Her  had been watching the sugar in her diet  She has not been exercising but hopes to get more active this summer  The following portions of the patient's history were reviewed and updated as appropriate:  past social history    Review of Systems      Current Outpatient Medications   Medication Sig Dispense Refill    amLODIPine (NORVASC) 10 mg tablet Take 1 tablet (10 mg total) by mouth daily 90 tablet 3    famotidine (PEPCID) 20 mg tablet Take 20 mg by mouth 2 (two) times a day      hydrochlorothiazide (MICROZIDE) 12 5 mg capsule Take 1 capsule (12 5 mg total) by mouth daily 90 capsule 3    omeprazole (PriLOSEC) 40 MG capsule TAKE ONE CAPSULE BY MOUTH DAILY (Patient taking differently: 3 times a week) 90 capsule 1    Tirosint 175 MCG CAPS TAKE ONE CAPSULE BY MOUTH DAILY 90 capsule 1    Tirosint 25 MCG CAPS Take 25 mcg by mouth daily Take with 150 to equal 175; name brand medically necessary 90 capsule 0     No current facility-administered medications for this visit  Objective:    /80   Pulse 81   Temp (!) 97 4 °F (36 3 °C)   Resp 18   Ht 5' 8" (1 727 m)   Wt 101 kg (223 lb)   LMP 06/29/2015 (Exact Date)   SpO2 99%   BMI 33 91 kg/m²      Physical Exam  Vitals and nursing note reviewed  Constitutional:       Appearance: She is well-developed     HENT: Head: Normocephalic and atraumatic  Right Ear: Tympanic membrane and external ear normal       Left Ear: Tympanic membrane and external ear normal    Cardiovascular:      Rate and Rhythm: Normal rate and regular rhythm  Heart sounds: Normal heart sounds  No murmur heard  No friction rub  Pulmonary:      Effort: Pulmonary effort is normal  No respiratory distress  Breath sounds: Normal breath sounds  No wheezing or rales  Musculoskeletal:         General: Tenderness (Left foot) present  Right lower leg: No edema  Left lower leg: No edema               Tuan Wilson DO

## 2022-06-10 NOTE — ASSESSMENT & PLAN NOTE
FIB 4 score is 1 2    Risks of fatty liver disease discussed  Risks of cirrhosis and liver cancer discussed    Lifestyle modification discussed

## 2022-06-10 NOTE — ASSESSMENT & PLAN NOTE
Fasting sugar and hemoglobin A1c remain in the prediabetic range   Patient is going to work on increasing her exercise and she has already started watching the carbs in her diet

## 2022-07-14 ENCOUNTER — HOSPITAL ENCOUNTER (OUTPATIENT)
Dept: RADIOLOGY | Facility: HOSPITAL | Age: 62
Discharge: HOME | End: 2022-07-14
Payer: COMMERCIAL

## 2022-07-14 VITALS — HEIGHT: 68 IN | BODY MASS INDEX: 33.8 KG/M2 | WEIGHT: 223 LBS

## 2022-07-14 DIAGNOSIS — Z12.31 SCREENING MAMMOGRAM, ENCOUNTER FOR: ICD-10-CM

## 2022-07-14 PROCEDURE — 77063 BREAST TOMOSYNTHESIS BI: CPT

## 2022-07-14 PROCEDURE — 77067 SCR MAMMO BI INCL CAD: CPT

## 2022-07-28 ENCOUNTER — TELEPHONE (OUTPATIENT)
Dept: FAMILY MEDICINE CLINIC | Facility: CLINIC | Age: 62
End: 2022-07-28

## 2022-07-29 ENCOUNTER — TELEPHONE (OUTPATIENT)
Dept: FAMILY MEDICINE CLINIC | Facility: CLINIC | Age: 62
End: 2022-07-29

## 2022-07-29 NOTE — TELEPHONE ENCOUNTER
Received a fax from [address] [place] [address] stating that the patients Tirosint 175mcg needs a prior auth  Submitted prior auth through covermymeds  Waiting for more questions or a response      Key:[id number]  ID: [id number]  Bin: [id number]  PCN: [id number]  Group: [id number]    Candie Ramirez LPN

## 2022-09-16 DIAGNOSIS — E89.0 HYPOTHYROIDISM, POSTRADIOIODINE THERAPY: ICD-10-CM

## 2022-09-16 RX ORDER — LEVOTHYROXINE SODIUM 175 UG/1
CAPSULE ORAL
Qty: 90 CAPSULE | Refills: 1 | Status: SHIPPED | OUTPATIENT
Start: 2022-09-16

## 2023-02-24 DIAGNOSIS — E89.0 HYPOTHYROIDISM, POSTRADIOIODINE THERAPY: ICD-10-CM

## 2023-02-24 RX ORDER — LEVOTHYROXINE SODIUM 175 UG/1
1 CAPSULE ORAL DAILY
Qty: 90 CAPSULE | Refills: 1 | Status: SHIPPED | OUTPATIENT
Start: 2023-02-24

## 2023-03-15 ENCOUNTER — VBI (OUTPATIENT)
Dept: ADMINISTRATIVE | Facility: OTHER | Age: 63
End: 2023-03-15

## 2023-03-15 NOTE — TELEPHONE ENCOUNTER
03/15/23 1:54 PM     [employer] SmartForm used to document caregap status      Vladislav Pickering MA

## 2023-05-12 ENCOUNTER — OFFICE VISIT (OUTPATIENT)
Dept: FAMILY MEDICINE CLINIC | Facility: CLINIC | Age: 63
End: 2023-05-12

## 2023-05-12 VITALS
HEIGHT: 68 IN | RESPIRATION RATE: 16 BRPM | DIASTOLIC BLOOD PRESSURE: 74 MMHG | WEIGHT: 213.25 LBS | BODY MASS INDEX: 32.32 KG/M2 | HEART RATE: 79 BPM | TEMPERATURE: 100 F | SYSTOLIC BLOOD PRESSURE: 122 MMHG

## 2023-05-12 DIAGNOSIS — G89.29 CHRONIC PAIN IN LEFT FOOT: Primary | ICD-10-CM

## 2023-05-12 DIAGNOSIS — I10 ESSENTIAL HYPERTENSION: ICD-10-CM

## 2023-05-12 DIAGNOSIS — E89.0 HYPOTHYROIDISM, POSTRADIOIODINE THERAPY: ICD-10-CM

## 2023-05-12 DIAGNOSIS — M79.672 CHRONIC PAIN IN LEFT FOOT: Primary | ICD-10-CM

## 2023-05-12 NOTE — PROGRESS NOTES
"Assessment/Plan:    1  Chronic pain in left foot  -     Ambulatory Referral to Podiatry; Future  -     Ambulatory Referral to Physical Therapy; Future    2  Essential hypertension  Assessment & Plan:  Stable with current regimen      3  Hypothyroidism, postradioiodine therapy  Assessment & Plan:  Complaint with current dose of tirosint and due for blood work and will do that soon                Patient Instructions:  Supportive care discussed and advised  Advised to RTO for any worsening and no improvement  Follow up for no improvement and worsening of conditions  Patient advised and educated when to see immediate medical care  Return if symptoms worsen or fail to improve  No future appointments  Subjective:      Patient ID: Betty Orosco is a 58 y o  female  Chief Complaint   Patient presents with   • foot numbness     Broke bones in past in foot and was never fixed  Since Monday numb from knee down after doing stairs several times monday         Vitals:  /74   Pulse 79   Temp 100 °F (37 8 °C) (Tympanic)   Resp 16   Ht 5' 8\" (1 727 m)   Wt 96 7 kg (213 lb 4 oz)   LMP 06/29/2015 (Exact Date)   BMI 32 42 kg/m²     HPI  Patient stated that had injury on her left foot about 6 years ago and since then having left foot pain and try to avoid stairs as aggravates in past and couple of days ago at work had to go up and down on stairs about 8 times and foot started hurting and got swollen and pain and numbness radiated to left lower leg and knee  Took advil, it is much better but would like to do PT and will refer to podiatrist too  Stated that numbness has almost resolved and pain is much better and does not want to take any medication at this time  Does wear foot supports in her shoes    Complaint with medications for chronic illnesses and tolerating it well        PHQ-2/9 Depression Screening    Little interest or pleasure in doing things: 0 - not at all  Feeling down, depressed, or " hopeless: 0 - not at all  PHQ-2 Score: 0  PHQ-2 Interpretation: Negative depression screen             The following portions of the patient's history were reviewed and updated as appropriate: allergies, current medications, past family history, past medical history, past social history, past surgical history and problem list       Review of Systems   HENT: Negative  Respiratory: Negative  Cardiovascular: Negative  Gastrointestinal: Negative  Musculoskeletal: Positive for arthralgias  As noted in HPI     Psychiatric/Behavioral: Negative  Objective:    Social History     Tobacco Use   Smoking Status Former   • Packs/day: 0 25   • Years: 18 00   • Pack years: 4 50   • Types: Cigarettes   • Start date: 1971   • Quit date: 1989   • Years since quittin 2   Smokeless Tobacco Never       Allergies: Allergies   Allergen Reactions   • Sulfa Antibiotics Hives         Current Outpatient Medications   Medication Sig Dispense Refill   • amLODIPine (NORVASC) 10 mg tablet Take 1 tablet (10 mg total) by mouth daily 90 tablet 3   • famotidine (PEPCID) 20 mg tablet Take 20 mg by mouth daily     • hydrochlorothiazide (MICROZIDE) 12 5 mg capsule Take 1 capsule (12 5 mg total) by mouth daily 90 capsule 3   • omeprazole (PriLOSEC) 40 MG capsule TAKE ONE CAPSULE BY MOUTH DAILY (Patient taking differently: 3 times a week) 90 capsule 1   • Tirosint 175 MCG CAPS Take 1 capsule by mouth daily 90 capsule 1   • Tirosint 25 MCG CAPS Take 25 mcg by mouth daily Take with 150 to equal 175; name brand medically necessary 90 capsule 0     No current facility-administered medications for this visit  Physical Exam  Constitutional:       Appearance: Normal appearance  HENT:      Head: Normocephalic and atraumatic  Nose: Nose normal    Eyes:      Conjunctiva/sclera: Conjunctivae normal    Cardiovascular:      Rate and Rhythm: Normal rate and regular rhythm  Pulses: Normal pulses        Heart sounds: Normal heart sounds  Pulmonary:      Effort: Pulmonary effort is normal       Breath sounds: Normal breath sounds  Musculoskeletal:        Feet:    Skin:     General: Skin is warm and dry  Findings: No rash  Neurological:      Mental Status: She is alert and oriented to person, place, and time  Psychiatric:         Mood and Affect: Mood normal          Behavior: Behavior normal          Thought Content:  Thought content normal          Judgment: Judgment normal                      MYRON Green

## 2023-05-16 LAB
ALBUMIN SERPL-MCNC: 4.1 G/DL (ref 3.8–4.8)
ALBUMIN/GLOB SERPL: 1.5 {RATIO} (ref 1.2–2.2)
ALP SERPL-CCNC: 94 IU/L (ref 44–121)
ALT SERPL-CCNC: 34 IU/L (ref 0–32)
AST SERPL-CCNC: 26 IU/L (ref 0–40)
BILIRUB SERPL-MCNC: 0.4 MG/DL (ref 0–1.2)
BUN SERPL-MCNC: 22 MG/DL (ref 8–27)
BUN/CREAT SERPL: 26 (ref 12–28)
CALCIUM SERPL-MCNC: 10 MG/DL (ref 8.7–10.3)
CHLORIDE SERPL-SCNC: 104 MMOL/L (ref 96–106)
CHOLEST SERPL-MCNC: 175 MG/DL (ref 100–199)
CO2 SERPL-SCNC: 23 MMOL/L (ref 20–29)
CREAT SERPL-MCNC: 0.84 MG/DL (ref 0.57–1)
EGFR: 78 ML/MIN/1.73
ERYTHROCYTE [DISTWIDTH] IN BLOOD BY AUTOMATED COUNT: 12.9 % (ref 11.7–15.4)
EST. AVERAGE GLUCOSE BLD GHB EST-MCNC: 126 MG/DL
GLOBULIN SER-MCNC: 2.8 G/DL (ref 1.5–4.5)
GLUCOSE SERPL-MCNC: 109 MG/DL (ref 70–99)
HBA1C MFR BLD: 6 % (ref 4.8–5.6)
HCT VFR BLD AUTO: 43 % (ref 34–46.6)
HDLC SERPL-MCNC: 45 MG/DL
HGB BLD-MCNC: 14.4 G/DL (ref 11.1–15.9)
LDLC SERPL CALC-MCNC: 111 MG/DL (ref 0–99)
LDLC/HDLC SERPL: 2.5 RATIO (ref 0–3.2)
MCH RBC QN AUTO: 30.5 PG (ref 26.6–33)
MCHC RBC AUTO-ENTMCNC: 33.5 G/DL (ref 31.5–35.7)
MCV RBC AUTO: 91 FL (ref 79–97)
MICRODELETION SYND BLD/T FISH: NORMAL
PLATELET # BLD AUTO: 214 X10E3/UL (ref 150–450)
POTASSIUM SERPL-SCNC: 4 MMOL/L (ref 3.5–5.2)
PROT SERPL-MCNC: 6.9 G/DL (ref 6–8.5)
RBC # BLD AUTO: 4.72 X10E6/UL (ref 3.77–5.28)
SL AMB VLDL CHOLESTEROL CALC: 19 MG/DL (ref 5–40)
SODIUM SERPL-SCNC: 142 MMOL/L (ref 134–144)
T4 FREE SERPL-MCNC: 1.59 NG/DL (ref 0.82–1.77)
TRIGL SERPL-MCNC: 103 MG/DL (ref 0–149)
TSH SERPL DL<=0.005 MIU/L-ACNC: 0.61 UIU/ML (ref 0.45–4.5)
WBC # BLD AUTO: 5.5 X10E3/UL (ref 3.4–10.8)

## 2023-05-22 DIAGNOSIS — I10 ESSENTIAL HYPERTENSION: ICD-10-CM

## 2023-05-22 RX ORDER — AMLODIPINE BESYLATE 10 MG/1
10 TABLET ORAL DAILY
Qty: 90 TABLET | Refills: 1 | Status: SHIPPED | OUTPATIENT
Start: 2023-05-22 | End: 2023-05-26 | Stop reason: SDUPTHER

## 2023-05-26 DIAGNOSIS — I10 ESSENTIAL HYPERTENSION: ICD-10-CM

## 2023-05-26 RX ORDER — AMLODIPINE BESYLATE 10 MG/1
10 TABLET ORAL DAILY
Qty: 90 TABLET | Refills: 1 | Status: SHIPPED | OUTPATIENT
Start: 2023-05-26

## 2023-05-26 NOTE — TELEPHONE ENCOUNTER
Requested medication(s) are due for refill today: Yes  Patient has already received a courtesy refill: No  Other reason request has been forwarded to provider: refill went to the wrong pharmacy   Please refuse

## 2023-07-10 ENCOUNTER — HOSPITAL ENCOUNTER (OUTPATIENT)
Dept: RADIOLOGY | Facility: HOSPITAL | Age: 63
Discharge: HOME | End: 2023-07-10
Attending: PODIATRIST
Payer: COMMERCIAL

## 2023-07-10 ENCOUNTER — OFFICE VISIT (OUTPATIENT)
Dept: PODIATRY | Facility: CLINIC | Age: 63
End: 2023-07-10
Payer: COMMERCIAL

## 2023-07-10 VITALS
SYSTOLIC BLOOD PRESSURE: 133 MMHG | OXYGEN SATURATION: 98 % | DIASTOLIC BLOOD PRESSURE: 83 MMHG | HEIGHT: 68 IN | BODY MASS INDEX: 33.8 KG/M2 | HEART RATE: 71 BPM | WEIGHT: 223 LBS

## 2023-07-10 DIAGNOSIS — M79.672 CHRONIC PAIN IN LEFT FOOT: ICD-10-CM

## 2023-07-10 DIAGNOSIS — G89.29 CHRONIC PAIN IN LEFT FOOT: ICD-10-CM

## 2023-07-10 DIAGNOSIS — M19.072 DJD (DEGENERATIVE JOINT DISEASE), ANKLE AND FOOT, LEFT: Primary | ICD-10-CM

## 2023-07-10 DIAGNOSIS — M21.42 PES PLANOVALGUS, ACQUIRED, LEFT: ICD-10-CM

## 2023-07-10 PROCEDURE — 20600 DRAIN/INJ JOINT/BURSA W/O US: CPT | Performed by: PODIATRIST

## 2023-07-10 PROCEDURE — 99203 OFFICE O/P NEW LOW 30 MIN: CPT | Performed by: PODIATRIST

## 2023-07-10 PROCEDURE — 73630 X-RAY EXAM OF FOOT: CPT

## 2023-07-10 RX ORDER — BUPIVACAINE HYDROCHLORIDE 2.5 MG/ML
1 INJECTION, SOLUTION EPIDURAL; INFILTRATION; INTRACAUDAL
Status: SHIPPED | OUTPATIENT
Start: 2023-07-10

## 2023-07-10 RX ORDER — TRIAMCINOLONE ACETONIDE 40 MG/ML
40 INJECTION, SUSPENSION INTRA-ARTICULAR; INTRAMUSCULAR
Status: SHIPPED | OUTPATIENT
Start: 2023-07-10

## 2023-07-10 RX ADMIN — TRIAMCINOLONE ACETONIDE 40 MG: 40 INJECTION, SUSPENSION INTRA-ARTICULAR; INTRAMUSCULAR at 10:30

## 2023-07-10 RX ADMIN — BUPIVACAINE HYDROCHLORIDE 1 ML: 2.5 INJECTION, SOLUTION EPIDURAL; INFILTRATION; INTRACAUDAL at 10:30

## 2023-07-10 NOTE — LETTER
July 11, 2023     Gabby Greene, [address] [facility] [address]    Patient: Jaime Miranda   YOB: 1960   Date of Visit: 7/10/2023       Dear Dr. Ian Cartagena: Thank you for referring Jaime Miranda to me for evaluation. Below are my notes for this consultation. If you have questions, please do not hesitate to call me. I look forward to following your patient along with you. Sincerely,        Jessica Morales DPM        CC: No Recipients    Adilson Grace  7/10/2023  5:10 PM  Signed  Assessment/Plan:    X-rays of the patient's left foot taken today were personally reviewed and interpreted and shows extensive arthritic changes to the midfoot and hindfoot. The met cuneiform joints as well as the naviculocuneiform joints are affected. Arthritic changes are also noted at the subtalar joint as well as the calcaneocuboid joint. On clinical examination, there is a pes planovalgus foot type with reduced range of motion of the midfoot and hindfoot joints. Dorsiflexion of the ankle joint is also reduced. Most of her tenderness can be isolated to the lateral aspect of the subtalar joint most likely secondary to impingement stemming from her flatfoot deformity. There is minimal tenderness with palpation of the midfoot joints. There is no erythema no edema no calor nor ecchymosis to the left lower extremity. Treatment options were discussed with the patient. She is not interested in surgical intervention at this point in time. We did decide to proceed with referring her to [facility] [employer] of an [address] [place] [address]. She would benefit from a long-term splinting of the hindfoot and ankle. In the interim, she was agreeable to steroid injection therapy to the subtalar joint as she will be leaving to a  vacation in 10 days. After prepping of skin with alcohol, a CSI was administered to the lateral aspect of the subtalar joint without complication.   The patient tolerated the injection well. I will also fit her for a lace up ankle brace to be used while she is on vacation to support the hindfoot and ankle. This is no substitute for custom molded brace as she will need a more controlling device that is custom molded due to the complexity of her hindfoot and midfoot arthritis. Recommend follow-up in 4 to 6 weeks. Diagnoses and all orders for this visit:    DJD (degenerative joint disease), ankle and foot, left  -     Durable Medical Equipment  -     Ankle Cude ankle/Ankle Brace  -     Small joint arthrocentesis: L subtalar    Chronic pain in left foot  -     Ambulatory Referral to Podiatry  -     X-ray foot left 3+ views; Future  -     Durable Medical Equipment  -     Ankle Cude ankle/Ankle Brace    Pes planovalgus, acquired, left  -     Durable Medical Equipment  -     Ankle Cude ankle/Ankle Brace    Other orders  -     Cancel: Medium joint arthrocentesis         Subjective:     Patient ID: Jaime Miranda is a 58 y.o. female. The patient presents today for initial consultation with [facility] with a chief complaint of left foot pain that has been present for several years. She states that she has a history of an undiagnosed cuneiform fracture. As result of that, and cause collapse of her mid foot arch. Since that time she has been dealing with a flatfoot deformity on the left side as well as increasing pain to the lateral aspect of the foot and ankle. She is currently in a pair of custom molded foot orthoses but does not feel that they are providing enough support. There has been no history of any recent injury or trauma to the left foot or ankle.       The following portions of the patient's history were reviewed and updated as appropriate: allergies, current medications, past family history, past medical history, past social history, past surgical history and problem list.      PAST MEDICAL HISTORY:  Past Medical History:   Diagnosis Date • Disease of thyroid gland    • Herniated lumbar intervertebral disc    • Hypertension    • Hypothyroidism    • Pneumonia        PAST SURGICAL HISTORY:  Past Surgical History:   Procedure Laterality Date   • BREAST BIOPSY Left     negative   • EGD AND COLONOSCOPY N/A 2016    Procedure: EGD AND COLONOSCOPY;  Surgeon: Messi Gillespie MD;  Location: [address] [place] [address] [address] GI LAB; Service:    • ESOPHAGOGASTRODUODENOSCOPY N/A 2016    Procedure: ESOPHAGOGASTRODUODENOSCOPY (EGD); Surgeon: Messi Gillespie MD;  Location: [facility] GI LAB; Service:    • FOOT ARTHRODESIS, MODIFIED SCHUMACHER Right    • FOOT SURGERY     • TOE SURGERY      hammtertoe, each toe        ALLERGIES:  Sulfa antibiotics    MEDICATIONS:  Current Outpatient Medications   Medication Sig Dispense Refill   • amLODIPine (NORVASC) 10 mg tablet Take 1 tablet (10 mg total) by mouth daily 90 tablet 1   • famotidine (PEPCID) 20 mg tablet Take 20 mg by mouth daily     • hydrochlorothiazide (MICROZIDE) 12.5 mg capsule Take 1 capsule by mouth daily. 90 capsule 1   • omeprazole (PriLOSEC) 40 MG capsule TAKE ONE CAPSULE BY MOUTH DAILY (Patient taking differently: 3 times a week) 90 capsule 1   • Tirosint 175 MCG CAPS Take 1 capsule by mouth daily 90 capsule 1     No current facility-administered medications for this visit. SOCIAL HISTORY:  Social History     Socioeconomic History   • Marital status: /Civil Union     Spouse name: None   • Number of children: None   • Years of education: None   • Highest education level: None   Occupational History   • Occupation:    Tobacco Use   • Smoking status: Former     Packs/day: 0.25     Years: 18.00     Total pack years: 4.50     Types: Cigarettes     Start date: 1971     Quit date: 1989     Years since quittin.3   • Smokeless tobacco: Never   Vaping Use   • Vaping Use: Never used   Substance and Sexual Activity   • Alcohol use:  Yes     Alcohol/week: 7.0 standard drinks of alcohol     Types: 7 Glasses of wine per week     Comment: occ   • Drug use: No   • Sexual activity: None   Other Topics Concern   • None   Social History Narrative   • None     Social Determinants of Health     Financial Resource Strain: Not on file   Food Insecurity: Not on file   Transportation Needs: Not on file   Physical Activity: Not on file   Stress: Not on file   Social Connections: Not on file   Intimate Partner Violence: Not on file   Housing Stability: Not on file        Review of Systems   Constitutional: Negative. HENT: Negative. Eyes: Negative. Respiratory: Negative. Cardiovascular: Negative. Endocrine: Negative. Musculoskeletal: Negative. Neurological: Negative. Hematological: Negative. Psychiatric/Behavioral: Negative. Objective:      /83 (BP Location: Left arm, Patient Position: Sitting, Cuff Size: Standard)   Pulse 71   Ht 5' 8" (1.727 m)   Wt 101 kg (223 lb)   LMP 06/29/2015 (Exact Date)   SpO2 98%   BMI 33.91 kg/m²         Physical Exam  Constitutional:       Appearance: Normal appearance. HENT:      Head: Normocephalic and atraumatic. Nose: Nose normal.   Cardiovascular:      Pulses:           Dorsalis pedis pulses are 2+ on the left side. Posterior tibial pulses are 1+ on the left side. Pulmonary:      Effort: Pulmonary effort is normal.   Musculoskeletal:      Left foot: Decreased range of motion. Feet:    Feet:      Comments: On clinical examination, there is a pes planovalgus foot type with reduced range of motion of the midfoot and hindfoot joints. Dorsiflexion of the ankle joint is also reduced. Most of her tenderness can be isolated to the lateral aspect of the subtalar joint most likely secondary to impingement stemming from her flatfoot deformity. There is minimal tenderness with palpation of the midfoot joints. There is no erythema no edema no calor nor ecchymosis to the left lower extremity. Skin:     General: Skin is warm. Capillary Refill: Capillary refill takes less than 2 seconds. Neurological:      General: No focal deficit present. Mental Status: She is alert and oriented to person, place, and time. Psychiatric:         Mood and Affect: Mood normal.         Behavior: Behavior normal.         Thought Content: Thought content normal.          Small joint arthrocentesis: L subtalar  Universal Protocol:  Consent: Verbal consent obtained. Risks and benefits: risks, benefits and alternatives were discussed  Consent given by: patient  Time out: Immediately prior to procedure a "time out" was called to verify the correct patient, procedure, equipment, support staff and site/side marked as required.   Timeout called at: 7/10/2023 11:00 AM.  Patient understanding: patient states understanding of the procedure being performed  Patient consent: the patient's understanding of the procedure matches consent given  Patient identity confirmed: verbally with patient and provided demographic data    Supporting Documentation  Indications: pain   Procedure Details  Location: foot - L subtalar  Needle size: 25 G  Ultrasound guidance: no  Approach: lateral  Medications administered: 1 mL bupivacaine (PF) 0.25 %; 40 mg triamcinolone acetonide 40 mg/mL    Patient tolerance: patient tolerated the procedure well with no immediate complications

## 2023-07-10 NOTE — PATIENT INSTRUCTIONS
[facility] (for [facility])  [facility], [address]    [phone]

## 2023-07-10 NOTE — PROGRESS NOTES
Assessment/Plan:    X-rays of the patient's left foot taken today were personally reviewed and interpreted and shows extensive arthritic changes to the midfoot and hindfoot. The met cuneiform joints as well as the naviculocuneiform joints are affected. Arthritic changes are also noted at the subtalar joint as well as the calcaneocuboid joint. On clinical examination, there is a pes planovalgus foot type with reduced range of motion of the midfoot and hindfoot joints. Dorsiflexion of the ankle joint is also reduced. Most of her tenderness can be isolated to the lateral aspect of the subtalar joint most likely secondary to impingement stemming from her flatfoot deformity. There is minimal tenderness with palpation of the midfoot joints. There is no erythema no edema no calor nor ecchymosis to the left lower extremity. Treatment options were discussed with the patient. She is not interested in surgical intervention at this point in time. We did decide to proceed with referring her to [facility] for [facility] Day of an [address] [place] [address]. She would benefit from a long-term splinting of the hindfoot and ankle. In the interim, she was agreeable to steroid injection therapy to the subtalar joint as she will be leaving to a  vacation in 10 days. After prepping of skin with alcohol, a CSI was administered to the lateral aspect of the subtalar joint without complication. The patient tolerated the injection well. I will also fit her for a lace up ankle brace to be used while she is on vacation to support the hindfoot and ankle. This is no substitute for custom molded brace as she will need a more controlling device that is custom molded due to the complexity of her hindfoot and midfoot arthritis. Recommend follow-up in 4 to 6 weeks.           Diagnoses and all orders for this visit:    DJD (degenerative joint disease), ankle and foot, left  -     Durable Medical Equipment  -     Ankle Cude ankle/Ankle Brace  -     Small joint arthrocentesis: L subtalar    Chronic pain in left foot  -     Ambulatory Referral to Podiatry  -     X-ray foot left 3+ views; Future  -     Durable Medical Equipment  -     Ankle Cude ankle/Ankle Brace    Pes planovalgus, acquired, left  -     Durable Medical Equipment  -     Ankle Cude ankle/Ankle Brace    Other orders  -     Cancel: Medium joint arthrocentesis          Subjective:      Patient ID: Richard Jose is a 58 y.o. female. The patient presents today for initial consultation with [facility] podiatry group with a chief complaint of left foot pain that has been present for several years. She states that she has a history of an undiagnosed cuneiform fracture. As result of that, and cause collapse of her mid foot arch. Since that time she has been dealing with a flatfoot deformity on the left side as well as increasing pain to the lateral aspect of the foot and ankle. She is currently in a pair of custom molded foot orthoses but does not feel that they are providing enough support. There has been no history of any recent injury or trauma to the left foot or ankle. The following portions of the patient's history were reviewed and updated as appropriate: allergies, current medications, past family history, past medical history, past social history, past surgical history and problem list.      PAST MEDICAL HISTORY:  Past Medical History:   Diagnosis Date   • Disease of thyroid gland    • Herniated lumbar intervertebral disc    • Hypertension    • Hypothyroidism    • Pneumonia        PAST SURGICAL HISTORY:  Past Surgical History:   Procedure Laterality Date   • BREAST BIOPSY Left 2010    negative   • EGD AND COLONOSCOPY N/A 2/29/2016    Procedure: EGD AND COLONOSCOPY;  Surgeon: Stanislaw Prasad MD;  Location: [address] [place] [address] GI LAB; Service:    • ESOPHAGOGASTRODUODENOSCOPY N/A 6/24/2016    Procedure: ESOPHAGOGASTRODUODENOSCOPY (EGD);   Surgeon: Stanislaw Prasad MD;  Location: [facility] GI LAB; Service:    • FOOT ARTHRODESIS, MODIFIED SCHUMACHER Right    • FOOT SURGERY     • TOE SURGERY      hammtertoe, each toe        ALLERGIES:  Sulfa antibiotics    MEDICATIONS:  Current Outpatient Medications   Medication Sig Dispense Refill   • amLODIPine (NORVASC) 10 mg tablet Take 1 tablet (10 mg total) by mouth daily 90 tablet 1   • famotidine (PEPCID) 20 mg tablet Take 20 mg by mouth daily     • hydrochlorothiazide (MICROZIDE) 12.5 mg capsule Take 1 capsule by mouth daily. 90 capsule 1   • omeprazole (PriLOSEC) 40 MG capsule TAKE ONE CAPSULE BY MOUTH DAILY (Patient taking differently: 3 times a week) 90 capsule 1   • Tirosint 175 MCG CAPS Take 1 capsule by mouth daily 90 capsule 1     No current facility-administered medications for this visit. SOCIAL HISTORY:  Social History     Socioeconomic History   • Marital status: /Civil Union     Spouse name: None   • Number of children: None   • Years of education: None   • Highest education level: None   Occupational History   • Occupation:    Tobacco Use   • Smoking status: Former     Packs/day: 0.25     Years: 18.00     Total pack years: 4.50     Types: Cigarettes     Start date: 1971     Quit date: 1989     Years since quittin.3   • Smokeless tobacco: Never   Vaping Use   • Vaping Use: Never used   Substance and Sexual Activity   • Alcohol use: Yes     Alcohol/week: 7.0 standard drinks of alcohol     Types: 7 Glasses of wine per week     Comment: occ   • Drug use: No   • Sexual activity: None   Other Topics Concern   • None   Social History Narrative   • None     Social Determinants of Health     Financial Resource Strain: Not on file   Food Insecurity: Not on file   Transportation Needs: Not on file   Physical Activity: Not on file   Stress: Not on file   Social Connections: Not on file   Intimate Partner Violence: Not on file   Housing Stability: Not on file        Review of Systems   Constitutional: Negative. HENT: Negative. Eyes: Negative. Respiratory: Negative. Cardiovascular: Negative. Endocrine: Negative. Musculoskeletal: Negative. Neurological: Negative. Hematological: Negative. Psychiatric/Behavioral: Negative. Objective:      /83 (BP Location: Left arm, Patient Position: Sitting, Cuff Size: Standard)   Pulse 71   Ht 5' 8" (1.727 m)   Wt 101 kg (223 lb)   LMP 06/29/2015 (Exact Date)   SpO2 98%   BMI 33.91 kg/m²          Physical Exam  Constitutional:       Appearance: Normal appearance. HENT:      Head: Normocephalic and atraumatic. Nose: Nose normal.   Cardiovascular:      Pulses:           Dorsalis pedis pulses are 2+ on the left side. Posterior tibial pulses are 1+ on the left side. Pulmonary:      Effort: Pulmonary effort is normal.   Musculoskeletal:      Left foot: Decreased range of motion. Feet:    Feet:      Comments: On clinical examination, there is a pes planovalgus foot type with reduced range of motion of the midfoot and hindfoot joints. Dorsiflexion of the ankle joint is also reduced. Most of her tenderness can be isolated to the lateral aspect of the subtalar joint most likely secondary to impingement stemming from her flatfoot deformity. There is minimal tenderness with palpation of the midfoot joints. There is no erythema no edema no calor nor ecchymosis to the left lower extremity. Skin:     General: Skin is warm. Capillary Refill: Capillary refill takes less than 2 seconds. Neurological:      General: No focal deficit present. Mental Status: She is alert and oriented to person, place, and time. Psychiatric:         Mood and Affect: Mood normal.         Behavior: Behavior normal.         Thought Content: Thought content normal.           Small joint arthrocentesis: L subtalar  Universal Protocol:  Consent: Verbal consent obtained.   Risks and benefits: risks, benefits and alternatives were discussed  Consent given by: patient  Time out: Immediately prior to procedure a "time out" was called to verify the correct patient, procedure, equipment, support staff and site/side marked as required.   Timeout called at: 7/10/2023 11:00 AM.  Patient understanding: patient states understanding of the procedure being performed  Patient consent: the patient's understanding of the procedure matches consent given  Patient identity confirmed: verbally with patient and provided demographic data    Supporting Documentation  Indications: pain   Procedure Details  Location: foot - L subtalar  Needle size: 25 G  Ultrasound guidance: no  Approach: lateral  Medications administered: 1 mL bupivacaine (PF) 0.25 %; 40 mg triamcinolone acetonide 40 mg/mL    Patient tolerance: patient tolerated the procedure well with no immediate complications

## 2023-07-18 ENCOUNTER — TELEPHONE (OUTPATIENT)
Dept: PODIATRY | Facility: CLINIC | Age: 63
End: 2023-07-18

## 2023-07-18 NOTE — TELEPHONE ENCOUNTER
[facility]    Doctor/Office : Dr. Laura Dempsey    Radiology CB# : [phone] Option [address] [facility] Radiology called to report Significant on X-Ray of right foot done 7/10. Please see Epic for details

## 2023-07-28 DIAGNOSIS — E89.0 HYPOTHYROIDISM, POSTRADIOIODINE THERAPY: ICD-10-CM

## 2023-07-28 RX ORDER — LEVOTHYROXINE SODIUM 175 UG/1
1 CAPSULE ORAL DAILY
Qty: 90 CAPSULE | Refills: 0 | Status: SHIPPED | OUTPATIENT
Start: 2023-07-28

## 2023-08-01 ENCOUNTER — TELEPHONE (OUTPATIENT)
Dept: FAMILY MEDICINE CLINIC | Facility: CLINIC | Age: 63
End: 2023-08-01

## 2023-08-01 NOTE — TELEPHONE ENCOUNTER
PA form completed on Cover [employer]  ID [id number]  PA was cancelled Stated not needed.   driss

## 2023-08-02 ENCOUNTER — TELEPHONE (OUTPATIENT)
Age: 63
End: 2023-08-02

## 2023-08-02 NOTE — TELEPHONE ENCOUNTER
Caller: Idania Chen    Doctor: Kaela Avendaño DPM    Reason for call: I scheduled Sophia Richardson for 9/1/23 because she received a letter stating that there were significant findings on her x-rays. Does she need this appointment or should she cancel it.     Call back#: [phone]

## 2023-08-07 DIAGNOSIS — I10 ESSENTIAL HYPERTENSION: ICD-10-CM

## 2023-08-07 DIAGNOSIS — E89.0 HYPOTHYROIDISM, POSTRADIOIODINE THERAPY: ICD-10-CM

## 2023-08-07 RX ORDER — LEVOTHYROXINE SODIUM 175 UG/1
1 CAPSULE ORAL DAILY
Qty: 90 CAPSULE | Refills: 1 | Status: SHIPPED | OUTPATIENT
Start: 2023-08-07

## 2023-08-07 RX ORDER — HYDROCHLOROTHIAZIDE 12.5 MG/1
CAPSULE, GELATIN COATED ORAL
Qty: 90 CAPSULE | Refills: 0 | Status: SHIPPED | OUTPATIENT
Start: 2023-08-07

## 2023-09-01 ENCOUNTER — OFFICE VISIT (OUTPATIENT)
Dept: PODIATRY | Facility: CLINIC | Age: 63
End: 2023-09-01
Payer: COMMERCIAL

## 2023-09-01 VITALS
BODY MASS INDEX: 33.34 KG/M2 | SYSTOLIC BLOOD PRESSURE: 134 MMHG | HEART RATE: 81 BPM | WEIGHT: 220 LBS | OXYGEN SATURATION: 100 % | HEIGHT: 68 IN | DIASTOLIC BLOOD PRESSURE: 90 MMHG

## 2023-09-01 DIAGNOSIS — M21.42 PES PLANOVALGUS, ACQUIRED, LEFT: ICD-10-CM

## 2023-09-01 DIAGNOSIS — M19.072 DJD (DEGENERATIVE JOINT DISEASE), ANKLE AND FOOT, LEFT: Primary | ICD-10-CM

## 2023-09-01 PROCEDURE — 99212 OFFICE O/P EST SF 10 MIN: CPT | Performed by: PODIATRIST

## 2023-09-01 NOTE — PROGRESS NOTES
Assessment/Plan: Today, there is no significant tenderness palpation to the lateral aspect of her left ankle and hindfoot. She has responded well to injection therapy with good resolution of tenderness with palpation of the lateral talocalcaneal joint. There is no significant tenderness palpation of the sinus tarsi today. Some mild tenderness is noted along the plantar medial midfoot where arthritic changes are noted. From a clinical standpoint the patient is doing well. She is able to ambulate with a regular pair shoes without any undue discomfort. She states that she was on vacation and was able to do a lot of walking without any significant pain or discomfort to her left foot and ankle. Her prior x-rays were reviewed with her in detail. Arthritic changes were noted along Lisfranc's joint. She will continue to monitor her condition and she will return to our office if her pain is to come back. For now she is essentially pain-free. She may follow-up on as-needed basis. Diagnoses and all orders for this visit:    DJD (degenerative joint disease), ankle and foot, left    Pes planovalgus, acquired, left          Subjective:     Patient ID: Rochelle Becerril is a 58 y.o. female. The patient presents today for follow-up of left lateral ankle and foot pain. There is been good interval improvement with injection therapy at her last visit. The patient states that she was able to go on vacation to [facility] and even walked 9 miles without any significant pain or discomfort. Today she is ambulating in a regular pair shoes without any undue discomfort.       PAST MEDICAL HISTORY:  Past Medical History:   Diagnosis Date   • Disease of thyroid gland    • Herniated lumbar intervertebral disc    • Hypertension    • Hypothyroidism    • Pneumonia        PAST SURGICAL HISTORY:  Past Surgical History:   Procedure Laterality Date   • BREAST BIOPSY Left 2010    negative   • EGD AND COLONOSCOPY N/A 2/29/2016 Procedure: EGD AND COLONOSCOPY;  Surgeon: Geovanni Ventura MD;  Location: [address] [place] [address] [address] GI LAB; Service:    • ESOPHAGOGASTRODUODENOSCOPY N/A 2016    Procedure: ESOPHAGOGASTRODUODENOSCOPY (EGD); Surgeon: Geovanni Ventura MD;  Location: [facility] GI LAB; Service:    • FOOT ARTHRODESIS, MODIFIED SCHUMACHER Right    • FOOT SURGERY     • TOE SURGERY      hammtertoe, each toe        ALLERGIES:  Sulfa antibiotics    MEDICATIONS:  Current Outpatient Medications   Medication Sig Dispense Refill   • amLODIPine (NORVASC) 10 mg tablet Take 1 tablet (10 mg total) by mouth daily 90 tablet 1   • famotidine (PEPCID) 20 mg tablet Take 20 mg by mouth daily     • hydrochlorothiazide (MICROZIDE) 12.5 mg capsule Take 1 capsule by mouth daily. 90 capsule 0   • omeprazole (PriLOSEC) 40 MG capsule TAKE ONE CAPSULE BY MOUTH DAILY (Patient taking differently: 3 times a week) 90 capsule 1   • Tirosint 175 MCG CAPS Take 1 capsule by mouth daily 90 capsule 1     Current Facility-Administered Medications   Medication Dose Route Frequency Provider Last Rate Last Admin   • bupivacaine (PF) (MARCAINE) 0.25 % injection 1 mL  1 mL Intra-articular  Harlin Cane Cardona, DPM   1 mL at 07/10/23 1030   • triamcinolone acetonide (KENALOG-40) 40 mg/mL injection 40 mg  40 mg Intra-articular  Harlin Cane Cardona, DPM   40 mg at 07/10/23 1030       SOCIAL HISTORY:  Social History     Socioeconomic History   • Marital status: /Civil Union     Spouse name: None   • Number of children: None   • Years of education: None   • Highest education level: None   Occupational History   • Occupation:    Tobacco Use   • Smoking status: Former     Packs/day: 0.25     Years: 18.00     Total pack years: 4.50     Types: Cigarettes     Start date: 1971     Quit date: 1989     Years since quittin.5   • Smokeless tobacco: Never   Vaping Use   • Vaping Use: Never used   Substance and Sexual Activity   • Alcohol use:  Yes     Alcohol/week: 7.0 standard drinks of alcohol     Types: 7 Glasses of wine per week     Comment: occ   • Drug use: No   • Sexual activity: None   Other Topics Concern   • None   Social History Narrative   • None     Social Determinants of Health     Financial Resource Strain: Not on file   Food Insecurity: Not on file   Transportation Needs: Not on file   Physical Activity: Not on file   Stress: Not on file   Social Connections: Not on file   Intimate Partner Violence: Not on file   Housing Stability: Not on file        Review of Systems   Constitutional: Negative. HENT: Negative. Eyes: Negative. Respiratory: Negative. Cardiovascular: Negative. Endocrine: Negative. Musculoskeletal: Negative. Neurological: Negative. Hematological: Negative. Psychiatric/Behavioral: Negative. Objective:     Physical Exam  Constitutional:       Appearance: Normal appearance. HENT:      Head: Normocephalic and atraumatic. Nose: Nose normal.   Cardiovascular:      Pulses:           Dorsalis pedis pulses are 2+ on the left side. Posterior tibial pulses are 2+ on the left side. Pulmonary:      Effort: Pulmonary effort is normal.   Feet:      Comments: Today, there is no significant tenderness palpation to the lateral aspect of her left ankle and hindfoot. She has responded well to injection therapy with good resolution of tenderness with palpation of the lateral talocalcaneal joint. There is no significant tenderness palpation of the sinus tarsi today. Some mild tenderness is noted along the plantar medial midfoot where arthritic changes are noted. Skin:     General: Skin is warm. Capillary Refill: Capillary refill takes less than 2 seconds. Neurological:      General: No focal deficit present. Mental Status: She is alert and oriented to person, place, and time. Psychiatric:         Mood and Affect: Mood normal.         Behavior: Behavior normal.         Thought Content:  Thought content normal.

## 2023-10-06 ENCOUNTER — HOSPITAL ENCOUNTER (OUTPATIENT)
Dept: RADIOLOGY | Facility: HOSPITAL | Age: 63
Discharge: HOME | End: 2023-10-06
Payer: COMMERCIAL

## 2023-10-06 DIAGNOSIS — Z12.31 ENCOUNTER FOR SCREENING MAMMOGRAM FOR MALIGNANT NEOPLASM OF BREAST: ICD-10-CM

## 2023-10-06 PROCEDURE — 77063 BREAST TOMOSYNTHESIS BI: CPT

## 2023-10-06 PROCEDURE — 77067 SCR MAMMO BI INCL CAD: CPT

## 2023-12-06 ENCOUNTER — OFFICE VISIT (OUTPATIENT)
Dept: FAMILY MEDICINE CLINIC | Facility: CLINIC | Age: 63
End: 2023-12-06
Payer: COMMERCIAL

## 2023-12-06 VITALS
HEART RATE: 76 BPM | BODY MASS INDEX: 32.33 KG/M2 | TEMPERATURE: 97.8 F | DIASTOLIC BLOOD PRESSURE: 80 MMHG | WEIGHT: 212.6 LBS | RESPIRATION RATE: 18 BRPM | SYSTOLIC BLOOD PRESSURE: 134 MMHG

## 2023-12-06 DIAGNOSIS — J20.9 ACUTE BRONCHITIS, UNSPECIFIED ORGANISM: Primary | ICD-10-CM

## 2023-12-06 DIAGNOSIS — E89.0 HYPOTHYROIDISM, POSTRADIOIODINE THERAPY: ICD-10-CM

## 2023-12-06 DIAGNOSIS — I10 ESSENTIAL HYPERTENSION: ICD-10-CM

## 2023-12-06 PROCEDURE — 99214 OFFICE O/P EST MOD 30 MIN: CPT | Performed by: NURSE PRACTITIONER

## 2023-12-06 RX ORDER — METHYLPREDNISOLONE 4 MG/1
TABLET ORAL
Qty: 21 TABLET | Refills: 0 | Status: SHIPPED | OUTPATIENT
Start: 2023-12-06

## 2023-12-06 RX ORDER — AZITHROMYCIN 250 MG/1
TABLET, FILM COATED ORAL
Qty: 6 TABLET | Refills: 0 | Status: SHIPPED | OUTPATIENT
Start: 2023-12-06 | End: 2023-12-11

## 2023-12-06 NOTE — PROGRESS NOTES
Assessment/Plan:    1. Acute bronchitis, unspecified organism  -     azithromycin (ZITHROMAX) 250 mg tablet; Take 500mg on day 1, 250mg on days 2-5  -     methylPREDNISolone 4 MG tablet therapy pack; Use as directed on package    2. Essential hypertension  Assessment & Plan:  Stable with current regimen      3. Hypothyroidism, postradioiodine therapy  Assessment & Plan:  Complaint with tirosint            Patient Instructions: Take medication with food. It is important that you take the entire course of antibiotics prescribed. May also take a probiotic of your choice to maintain healthy GI sravanthi. Can take some probiotic and yogurt with the medication. Take prednisone with food in morning and do not take any NSAID's while taking prednisone. Increase fluid intake, saline nasal rinses, and hot tea with honey and lemon. Cool air humidification can be helpful as well. May take Tylenol as needed for pain or fevers. Mucinex D for sinus congestion or Coricidin HBP if you have high blood pressure or a heart condition. Mucinex or Robitussin DM are effective for cough and chest congestion. Return if symptoms worsen or fail to improve. No future appointments. Subjective:      Patient ID: Celina Garza is a 58 y.o. female. Chief Complaint   Patient presents with   • sick visit     Sore throat, ear issues, head cold now in chest, fever, phlegm, burning in chest  HK CMA          Vitals:  /80   Pulse 76   Temp 97.8 °F (36.6 °C)   Resp 18   Wt 96.4 kg (212 lb 9.6 oz)   LMP 06/29/2015 (Exact Date)   BMI 32.33 kg/m²     HPI  Patient stated that started with congestion, sore throat and cough couple of days and progressed to wheezing. Denies fever, chills and sob. Does not want to be tested for covid at this time.    Complaint with medications for chronic illnesses and tolerating it well        The following portions of the patient's history were reviewed and updated as appropriate: allergies, current medications, past family history, past medical history, past social history, past surgical history and problem list.      Review of Systems   Constitutional:  Negative for chills, diaphoresis, fatigue, fever and unexpected weight change. HENT:  Positive for congestion and sore throat. Negative for dental problem, drooling, ear discharge, ear pain, facial swelling, hearing loss, mouth sores, nosebleeds, postnasal drip, rhinorrhea, sinus pressure, sinus pain, sneezing, tinnitus, trouble swallowing and voice change. Respiratory:  Positive for cough and wheezing. Negative for chest tightness and shortness of breath. Cardiovascular: Negative. Gastrointestinal:  Negative for abdominal pain, constipation, diarrhea, nausea and vomiting. Musculoskeletal: Negative. Skin: Negative. Neurological:  Negative for dizziness, light-headedness and headaches. Objective:    Social History     Tobacco Use   Smoking Status Former   • Packs/day: 0.25   • Years: 18.00   • Total pack years: 4.50   • Types: Cigarettes   • Start date: 1971   • Quit date: 1989   • Years since quittin.7   Smokeless Tobacco Never       Allergies: Allergies   Allergen Reactions   • Sulfa Antibiotics Hives         Current Outpatient Medications   Medication Sig Dispense Refill   • amLODIPine (NORVASC) 10 mg tablet Take 1 tablet (10 mg total) by mouth daily 90 tablet 1   • azithromycin (ZITHROMAX) 250 mg tablet Take 500mg on day 1, 250mg on days 2-5 6 tablet 0   • famotidine (PEPCID) 20 mg tablet Take 20 mg by mouth daily     • hydrochlorothiazide (MICROZIDE) 12.5 mg capsule Take 1 capsule by mouth daily.  90 capsule 0   • methylPREDNISolone 4 MG tablet therapy pack Use as directed on package 21 tablet 0   • omeprazole (PriLOSEC) 40 MG capsule TAKE ONE CAPSULE BY MOUTH DAILY (Patient taking differently: 3 times a week) 90 capsule 1   • Tirosint 175 MCG CAPS Take 1 capsule by mouth daily 90 capsule 1 Current Facility-Administered Medications   Medication Dose Route Frequency Provider Last Rate Last Admin   • bupivacaine (PF) (MARCAINE) 0.25 % injection 1 mL  1 mL Intra-articular  Vee Nian Cardona, DPM   1 mL at 07/10/23 1030   • triamcinolone acetonide (KENALOG-40) 40 mg/mL injection 40 mg  40 mg Intra-articular  Vee Nian Cardona, DPM   40 mg at 07/10/23 1030          Physical Exam  Vitals reviewed. Constitutional:       Appearance: Normal appearance. She is well-developed. HENT:      Head: Normocephalic. Right Ear: Tympanic membrane, ear canal and external ear normal.      Left Ear: Tympanic membrane, ear canal and external ear normal.      Nose: Nose normal.      Right Sinus: No maxillary sinus tenderness or frontal sinus tenderness. Left Sinus: No maxillary sinus tenderness or frontal sinus tenderness. Mouth/Throat:      Mouth: No oral lesions. Pharynx: No oropharyngeal exudate or posterior oropharyngeal erythema. Cardiovascular:      Rate and Rhythm: Normal rate and regular rhythm. Heart sounds: Normal heart sounds. Pulmonary:      Effort: Pulmonary effort is normal.      Breath sounds: Wheezing present. Musculoskeletal:         General: Normal range of motion. Cervical back: Neck supple. Lymphadenopathy:      Cervical:      Right cervical: No superficial or posterior cervical adenopathy. Left cervical: No superficial or posterior cervical adenopathy. Skin:     General: Skin is warm and dry. Neurological:      Mental Status: She is alert and oriented to person, place, and time. Psychiatric:         Behavior: Behavior normal.         Thought Content:  Thought content normal.         Judgment: Judgment normal.                     MYRON Smith

## 2023-12-06 NOTE — PATIENT INSTRUCTIONS
Methylprednisolone (By mouth)   Methylprednisolone (meth-il-pred-NIS-oh-lone)  Treats inflammation, severe allergies, flare-ups of ongoing illnesses, and many other medical problems. May also be used to decrease some symptoms of cancer. This medicine is a corticosteroid. Brand Name(s): Medrol, Medrol Dosepak, Methylpred-DP   There may be other brand names for this medicine. When This Medicine Should Not Be Used: This medicine is not right for everyone. Do not use it if you had an allergic reaction to methylprednisolone, or if you have a fungus infection. How to Use This Medicine:   Tablet  Take your medicine as directed. Your dose may need to be changed several times to find what works best for you. If you are using this medicine for an ongoing illness, your dose may need to be changed occasionally. Some people take this medicine only every other day, which helps to decrease side effects. Take your medicine in the morning, unless your doctor tells you otherwise. It is best to take this medicine with food or milk. Missed dose: Take a dose as soon as you remember. If it is almost time for your next dose, wait until then and take a regular dose. Do not take extra medicine to make up for a missed dose. Store the medicine in a closed container at room temperature, away from heat, moisture, and direct light. Drugs and Foods to Avoid:   Ask your doctor or pharmacist before using any other medicine, including over-the-counter medicines, vitamins, and herbal products. Some medicines can affect how methylprednisolone works. Tell your doctor if you are using any of the following:  Cyclosporine, ketoconazole, phenobarbital, phenytoin, rifampin, troleandomycin  Aspirin, especially in high doses  Blood thinner (including warfarin)  Diabetes medicine  This medicine may interfere with vaccines. Ask your doctor before you get a flu shot or any other vaccines.   Warnings While Using This Medicine:   Tell your doctor if you are pregnant or breastfeeding, or if you have kidney disease, liver disease (including cirrhosis), adrenal gland problems, heart failure, high blood pressure, diabetes, osteoporosis, blood clotting problems, thyroid problems, mental health problems (including depression), myasthenia gravis, or stomach or bowel problems (including ulcer or diverticulitis). Tell your doctor if you have an infection (including herpes eye infection, tuberculosis, or threadworm). Also tell your doctor if you have a recent exposure to chickenpox or measles. This medicine may cause the following problems:  Increased risk of infection  Changes in mood or behavior  High blood pressure  Adrenal gland problems  Eye problems or changes in vision (including cataracts or glaucoma)  Bone problems (including osteoporosis)  Slow growth in children  Increased risk for cancer (including Kaposi's sarcoma)  If you use this medicine for a long time, tell your doctor about any extra stress or anxiety in your life, including other health concerns and emotional stress. Your dose might need to be changed for a short time while you have extra stress. Do not stop using this medicine suddenly. Your doctor will need to slowly decrease your dose before you stop it completely. Tell any doctor or dentist who treats you that you are using this medicine. This medicine may affect certain medical test results. Your doctor will do lab tests at regular visits to check on the effects of this medicine. Keep all appointments. Keep all medicine out of the reach of children. Never share your medicine with anyone. Possible Side Effects While Using This Medicine:   Call your doctor right away if you notice any of these side effects:   Allergic reaction: Itching or hives, swelling in your face or hands, swelling or tingling in your mouth or throat, chest tightness, trouble breathing  Bone pain, decrease in height  Dark freckles, skin color changes, coldness, weakness, tiredness, weight loss  Depression, unusual thoughts, feelings, or behaviors, trouble sleeping  Fever, chills, cough, sore throat, body aches  Severe stomach pain, nausea, vomiting, or red or black stools  Skin changes or growths  Swelling in your hands, ankles, or feet, rapid weight gain  Trouble seeing, blurred vision or other changes in vision, eye pain, headache  Unusual bleeding or bruising  If you notice these less serious side effects, talk with your doctor: Increased appetite  Round, puffy face  Weight gain around your neck, upper back, breast, face, or waist  If you notice other side effects that you think are caused by this medicine, tell your doctor. Call your doctor for medical advice about side effects. You may report side effects to FDA at 5-711-FDA-5264  © Copyright [facility] 2023 Information is for End User's use only and may not be sold, redistributed or otherwise used for commercial purposes. The above information is an  only. It is not intended as medical advice for individual conditions or treatments. Talk to your doctor, nurse or pharmacist before following any medical regimen to see if it is safe and effective for you. Azithromycin (By mouth)   Azithromycin (kr-ckzt-ybv-MYE-sin)  Treats infections. This medicine is a macrolide antibiotic. Brand Name(s): Zithromax, Zithromax Tri-Adriano, Zithromax Z-Adriano, Zmax   There may be other brand names for this medicine. When This Medicine Should Not Be Used: This medicine is not right for everyone. Do not use it if you had an allergic reaction to azithromycin, erythromycin, or similar medicines, or if you have a history of liver problems caused by azithromycin. How to Use This Medicine:   Capsule, Liquid, Packet, Powder, Tablet  Your doctor will tell you how much medicine to use. Do not use more than directed.   Take all of the medicine in your prescription to clear up your infection, even if you feel better after the first few doses.  Multiple dose (Zithromax® oral liquid or tablets): You may take this medicine with or without food. Shake the bottle well before you measure the medicine. Measure the oral liquid medicine with a marked measuring spoon, oral syringe, or medicine cup. Single dose (Zmax® extended-release oral liquid or powder):   Liquid:   Take this medicine on an empty stomach at least 1 hour before you eat, or 2 hours after you eat. Call your doctor right away if you vomit within 1 hour after you take the medicine. You must take the liquid within 12 hours after the pharmacist gives it to you. Shake the bottle well before you measure the medicine. Measure your dose with a marked measuring spoon, cup, or syringe. Powder:   Open 1 packet and pour all of the medicine into a glass with about 2 ounces (¼ cup) of water. Mix well and drink the medicine right away. Pour another 2 ounces of water into the same glass, and drink the remaining medicine. Read and follow the patient instructions that come with this medicine. Talk to your doctor or pharmacist if you have any questions. Missed dose: If you are taking multiple doses, take the dose as soon as you remember. If it is almost time for your next dose, wait until then to take a regular dose. Do not use extra medicine to make up for a missed dose. Store the medicine in a closed container at room temperature, away from heat, moisture, and direct light. Extended-release oral liquid: Do not refrigerate or freeze. Oral liquid for 1 dose only: Store at room temperature. Do not store in the refrigerator or allow the medicine to freeze. Oral liquid for multiple doses: Store at room temperature or in the refrigerator. Use it within 10 days of filling the prescription. Drugs and Foods to Avoid:   Ask your doctor or pharmacist before using any other medicine, including over-the-counter medicines, vitamins, and herbal products. Some medicines can affect how azithromycin works. Tell your doctor if you are also using any of the following:  Colchicine, cyclosporine, digoxin, nelfinavir, phenytoin  Blood thinner (including warfarin)  Ergot medicine  Medicine for a heart rhythm problem (including amiodarone, dofetilide, procainamide, quinidine, sotalol)  Zithromax® for multiple doses: Do not take an antacid that contains magnesium or aluminum at the same time you take Zithromax®. An antacid will affect how the medicine works. Antacids will not affect Zmax® for single dose. Warnings While Using This Medicine:   Tell your doctor if you are pregnant or breastfeeding, or if you have kidney disease, liver disease, heart disease, heart rhythm problems, heart failure, or myasthenia gravis. Tell your doctor if anyone in your family has heart rhythm problems. This medicine may cause the following problems:   Serious skin reactions, including Coronado-Ortiz syndrome, acute generalized exanthematous pustulosis, toxic epidermal necrolysis, and drug reaction with eosinophilia and systemic symptoms (DRESS)  Liver problems  Infantile hypertrophic pyloric stenosis  Heart rhythm problems, including QT prolongation  Increased risk of serious heart or blood vessel problems  This medicine can cause diarrhea. Call your doctor if the diarrhea becomes severe, does not stop, or is bloody. Do not take any medicine to stop diarrhea until you have talked to your doctor. Diarrhea can occur 2 months or more after you stop taking this medicine. It may occur 2 months or more after you stop using this medicine. Call your doctor if your symptoms do not improve or if they get worse. Your doctor will do lab tests at regular visits to check on the effects of this medicine. Keep all appointments. Keep all medicine out of the reach of children. Never share your medicine with anyone. Possible Side Effects While Using This Medicine:   Call your doctor right away if you notice any of these side effects:   Allergic reaction: Itching or hives, swelling in your face or hands, swelling or tingling in your mouth or throat, chest tightness, trouble breathing  Blistering, peeling, red skin rash  Dark urine, pale stools, nausea, vomiting, loss of appetite, stomach pain, yellow skin or eyes  Fainting, dizziness, lightheadedness  Fast, pounding, or uneven heartbeat, blurred vision, chest pain, trouble breathing, tiredness or weakness  Feeling irritable or vomits after feeding (in babies)  Severe diarrhea that may contain blood, stomach cramps, fever  If you notice these less serious side effects, talk with your doctor:   Mild diarrhea, nausea, vomiting, stomach pain  If you notice other side effects that you think are caused by this medicine, tell your doctor. Call your doctor for medical advice about side effects. You may report side effects to FDA at 5-238-FDA-6875  © Copyright [employer] 2023 Information is for End User's use only and may not be sold, redistributed or otherwise used for commercial purposes. The above information is an  only. It is not intended as medical advice for individual conditions or treatments. Talk to your doctor, nurse or pharmacist before following any medical regimen to see if it is safe and effective for you.

## 2023-12-06 NOTE — LETTER
December 6, 2023     Patient: Cortney Schaffer  YOB: 1960  Date of Visit: 12/6/2023      To Whom it May Concern:    Cortney Schaffer is under my professional care. Neymar Garcia was seen in my office on 12/6/2023. Neymar Garcia may return to work on 12/11/2023 . If you have any questions or concerns, please don't hesitate to call.          Sincerely,          MYRON Enamorado        CC: No Recipients

## 2024-01-16 DIAGNOSIS — I10 ESSENTIAL HYPERTENSION: ICD-10-CM

## 2024-01-16 RX ORDER — AMLODIPINE BESYLATE 10 MG/1
10 TABLET ORAL DAILY
Qty: 90 TABLET | Refills: 1 | Status: SHIPPED | OUTPATIENT
Start: 2024-01-16

## 2024-01-21 DIAGNOSIS — E89.0 HYPOTHYROIDISM, POSTRADIOIODINE THERAPY: ICD-10-CM

## 2024-01-22 RX ORDER — LEVOTHYROXINE SODIUM 175 UG/1
1 CAPSULE ORAL DAILY
Qty: 90 CAPSULE | Refills: 1 | Status: SHIPPED | OUTPATIENT
Start: 2024-01-22

## 2024-02-14 DIAGNOSIS — I10 ESSENTIAL HYPERTENSION: ICD-10-CM

## 2024-02-14 RX ORDER — HYDROCHLOROTHIAZIDE 12.5 MG/1
CAPSULE, GELATIN COATED ORAL
Qty: 90 CAPSULE | Refills: 1 | Status: SHIPPED | OUTPATIENT
Start: 2024-02-14

## 2024-04-10 DIAGNOSIS — Z00.6 ENCOUNTER FOR EXAMINATION FOR NORMAL COMPARISON OR CONTROL IN CLINICAL RESEARCH PROGRAM: ICD-10-CM

## 2024-04-14 DIAGNOSIS — E89.0 HYPOTHYROIDISM, POSTRADIOIODINE THERAPY: ICD-10-CM

## 2024-04-14 DIAGNOSIS — I10 ESSENTIAL HYPERTENSION: ICD-10-CM

## 2024-04-14 RX ORDER — LEVOTHYROXINE SODIUM 175 UG/1
1 CAPSULE ORAL DAILY
Qty: 90 CAPSULE | Refills: 0 | Status: SHIPPED | OUTPATIENT
Start: 2024-04-14

## 2024-04-14 RX ORDER — AMLODIPINE BESYLATE 10 MG/1
10 TABLET ORAL DAILY
Qty: 90 TABLET | Refills: 0 | Status: SHIPPED | OUTPATIENT
Start: 2024-04-14

## 2024-04-15 ENCOUNTER — OFFICE VISIT (OUTPATIENT)
Dept: FAMILY MEDICINE CLINIC | Facility: CLINIC | Age: 64
End: 2024-04-15
Payer: COMMERCIAL

## 2024-04-15 ENCOUNTER — APPOINTMENT (OUTPATIENT)
Dept: RADIOLOGY | Facility: CLINIC | Age: 64
End: 2024-04-15
Payer: COMMERCIAL

## 2024-04-15 VITALS
RESPIRATION RATE: 20 BRPM | BODY MASS INDEX: 32.67 KG/M2 | HEART RATE: 80 BPM | TEMPERATURE: 103.1 F | DIASTOLIC BLOOD PRESSURE: 72 MMHG | WEIGHT: 215.6 LBS | HEIGHT: 68 IN | SYSTOLIC BLOOD PRESSURE: 124 MMHG

## 2024-04-15 DIAGNOSIS — J20.9 ACUTE BRONCHITIS, UNSPECIFIED ORGANISM: ICD-10-CM

## 2024-04-15 DIAGNOSIS — J20.9 ACUTE BRONCHITIS, UNSPECIFIED ORGANISM: Primary | ICD-10-CM

## 2024-04-15 DIAGNOSIS — I10 ESSENTIAL HYPERTENSION: ICD-10-CM

## 2024-04-15 PROCEDURE — 71046 X-RAY EXAM CHEST 2 VIEWS: CPT

## 2024-04-15 PROCEDURE — 99214 OFFICE O/P EST MOD 30 MIN: CPT | Performed by: NURSE PRACTITIONER

## 2024-04-15 RX ORDER — HYDROCODONE POLISTIREX AND CHLORPHENIRAMINE POLISTIREX 10; 8 MG/5ML; MG/5ML
5 SUSPENSION, EXTENDED RELEASE ORAL EVERY 12 HOURS PRN
Qty: 115 ML | Refills: 0 | Status: SHIPPED | OUTPATIENT
Start: 2024-04-15

## 2024-04-15 RX ORDER — LEVOFLOXACIN 750 MG/1
750 TABLET, FILM COATED ORAL EVERY 24 HOURS
Qty: 7 TABLET | Refills: 0 | Status: SHIPPED | OUTPATIENT
Start: 2024-04-15 | End: 2024-04-22

## 2024-04-15 NOTE — LETTER
April 15, 2024     Patient: Lida Noyola  YOB: 1960  Date of Visit: 4/15/2024      To Whom it May Concern:    Lida Noyola is under my professional care. Lida was seen in my office on 4/15/2024. Lida may return to work on 4/22/2024. Please excuse from work from 4/11/2024 till 4/19/2024 .    If you have any questions or concerns, please don't hesitate to call.         Sincerely,          MYRON Miller        CC: No Recipients

## 2024-04-15 NOTE — PATIENT INSTRUCTIONS
Levofloxacin (By mouth)   Levofloxacin (hfz-whn-ZUSZ-a-sin)  Treats infections and plague (including pneumonic and septicemic plague). It is also given to people who have been exposed to anthrax. This medicine is a quinolone antibiotic.   Brand Name(s):   There may be other brand names for this medicine.  When This Medicine Should Not Be Used:   This medicine is not right for everyone. Do not use it if you had an allergic reaction to levofloxacin or to similar medicines.  How to Use This Medicine:   Liquid, Tablet  Your doctor will tell you how much medicine to use. Do not use more than directed. Take your medicine at the same time each day.  Oral liquid: Take it 1 hour before or 2 hours after you eat. Measure the oral liquid medicine with a marked measuring spoon, oral syringe, or medicine cup.  Tablet: Take it with or without food.  Take all of the medicine in your prescription to clear up your infection, even if you feel better after the first few doses.  Drink extra fluids so you will urinate more often and help prevent kidney problems.  This medicine should come with a Medication Guide. Ask your pharmacist for a copy if you do not have one.  Missed dose: Take a dose as soon as you remember. If it is almost time for your next dose, wait until then and take a regular dose. Do not take extra medicine to make up for a missed dose.  Store the medicine in a closed container at room temperature, away from heat, moisture, and direct light.  Drugs and Foods to Avoid:   Ask your doctor or pharmacist before using any other medicine, including over-the-counter medicines, vitamins, and herbal products.  Some medicines can affect how levofloxacin works. Tell your doctor if you are using any of the following:  Theophylline  Blood thinner (including warfarin)  Insulin or oral diabetes medicine (including glyburide, insulin)  Medicine for heart rhythm problems (including amiodarone, procainamide, quinidine, sotalol)  Medicine  to treat depression or mental illness  NSAID pain or arthritis medicine (including aspirin, celecoxib, diclofenac, ibuprofen, naproxen)  Steroid medicine (including hydrocortisone, methylprednisolone, prednisone)  Take levofloxacin at least 2 hours before or 2 hours after didanosine buffered tablets for oral suspension or the pediatric powder for oral suspension, sucralfate, or antacids, multivitamins, or other products containing aluminum, magnesium, iron, or zinc.  Warnings While Using This Medicine:   Tell your doctor if you are pregnant or breastfeeding, or if you have kidney disease, liver disease, diabetes, heart disease, myasthenia gravis, brain problems, aortic aneurysm (bulge in the wall of the largest artery), or a history of heart rhythm problems (including QT prolongation), seizures, or mental illness. Tell your doctor if you have ever had tendon or joint problems, including rheumatoid arthritis, or if you have received a transplant.  This medicine may cause the following problems:  Tendinitis and tendon rupture (may happen after treatment ends)  Nerve damage in the arms or legs, which may be permanent  Changes in mood or behavior, seizures, or increased pressure in the head  Serious skin reactions  Kidney problems  Liver problems  Increased risk of aortic aneurysm  Heart rhythm changes  Changes in blood sugar levels  This medicine may make you feel dizzy or lightheaded. Do not drive or do anything else that could be dangerous until you know how this medicine affects you.  This medicine may make you bleed, bruise, or get infections more easily. Take precautions to prevent illness and injury. Wash your hands often.  This medicine can cause diarrhea. Call your doctor if the diarrhea becomes severe, does not stop, or is bloody. Do not take any medicine to stop diarrhea until you have talked to your doctor. Diarrhea can occur 2 months or more after you stop taking this medicine.  Tell any doctor or dentist  who treats you that you are using this medicine. This medicine may affect certain medical test results.  This medicine may make your skin more sensitive to sunlight. Wear sunscreen. Do not use sunlamps or tanning beds.  Call your doctor if your symptoms do not improve or if they get worse.  Your doctor will do lab tests at regular visits to check on the effects of this medicine. Keep all appointments.  Keep all medicine out of the reach of children. Never share your medicine with anyone.  Possible Side Effects While Using This Medicine:   Call your doctor right away if you notice any of these side effects:  Allergic reaction: Itching or hives, swelling in your face or hands, swelling or tingling in your mouth or throat, chest tightness, trouble breathing  Blistering, peeling, red skin rash  Change in how much or how often you urinate, cloudy or bloody urine  Dark urine or pale stools, nausea, vomiting, loss of appetite, stomach pain, yellow skin or eyes  Diarrhea that may contain blood  Fainting, dizziness, or lightheadedness  Fast, slow, or uneven heartbeat, chest pain  Numbness, tingling, or burning pain in your hands, arms, legs, or feet  Pain, stiffness, swelling, or bruises around your ankle, leg, shoulder, or other joint  Seizures, severe headache, unusual thoughts or behaviors, trouble sleeping, feeling anxious, confused, or depressed, seeing, hearing, or feeling things that are not there  Sensitivity of the skin to sunlight, redness or other discoloration of the skin, severe sunburn  Sudden chest, stomach, or back pain, trouble breathing, cough  Unusual bleeding, bruising, or weakness  If you notice these less serious side effects, talk with your doctor:   Mild headache or nausea  If you notice other side effects that you think are caused by this medicine, tell your doctor.   Call your doctor for medical advice about side effects. You may report side effects to FDA at 2-504-FDA-3381  © Copyright [employer]  2023 Information is for End User's use only and may not be sold, redistributed or otherwise used for commercial purposes.  The above information is an  only. It is not intended as medical advice for individual conditions or treatments. Talk to your doctor, nurse or pharmacist before following any medical regimen to see if it is safe and effective for you.

## 2024-04-15 NOTE — PROGRESS NOTES
"Assessment/Plan:  Pneumonia is in differential and will start on levaquin and will follow with STAT chest xray. Do not drive and operate any machinery after taking cough medication.    1. Acute bronchitis, unspecified organism  -     Hydrocod Jn-Chlorphe Jn ER (TUSSIONEX) 10-8 mg/5 mL ER suspension; Take 5 mL by mouth every 12 (twelve) hours as needed for cough Max Daily Amount: 10 mL  -     XR chest pa & lateral; Future; Expected date: 04/15/2024  -     levofloxacin (LEVAQUIN) 750 mg tablet; Take 1 tablet (750 mg total) by mouth every 24 hours for 7 days    2. Essential hypertension  Assessment & Plan:  Stable with current regimen            Patient Instructions:  Take medication with food.  It is important that you take the entire course of antibiotics prescribed.  May also take a probiotic of your choice to maintain healthy GI sravanthi.    Can take some probiotic and yogurt with the medication.  Supportive care discussed and advised.  Advised to RTO for any worsening and no improvement.   Follow up for no improvement and worsening of conditions.  Patient advised and educated when to see immediate medical care.    Return if symptoms worsen or fail to improve.      Future Appointments   Date Time Provider Department Center   4/15/2024  9:00 AM [facility]           Subjective:      Patient ID: Lida Noyola is a 64 y.o. female.    Chief Complaint   Patient presents with   • Cough     Symptoms started over a week ago   YC     • Shortness of Breath   • Chest Pain   • Back Pain         Vitals:  /72   Pulse 80   Temp (!) 103.1 °F (39.5 °C) (Tympanic)   Resp 20   Ht 5' 8\" (1.727 m)   Wt 97.8 kg (215 lb 9.6 oz)   LMP 06/29/2015 (Exact Date)   BMI 32.78 kg/m²     Patient started with cough on 4/6/24 and symptoms progressed day after that to fever, headaches, body aches, and sob. Stated that having right mid lower back pain from couple of days and cough is worse. Stated that her brother in "  is nurse and licensed to her lungs and stated that heard crackles on right lower leg and concerned that she has pneumonia. Stated that her chest is sore from coughing  Complaint with medications for chronic illnesses    Cough  This is a new problem. The current episode started in the past 7 days. The problem has been waxing and waning. The problem occurs every few minutes. The cough is Productive of sputum. Associated symptoms include chest pain, chills, a fever, headaches, myalgias, shortness of breath, sweats and weight loss. Pertinent negatives include no ear congestion, ear pain, nasal congestion, postnasal drip, rash, rhinorrhea, sore throat or wheezing. Nothing aggravates the symptoms.               PHQ-2/9 Depression Screening    Little interest or pleasure in doing things: 3 - nearly every day  Feeling down, depressed, or hopeless: 0 - not at all  Trouble falling or staying asleep, or sleeping too much: 0 - not at all  Feeling tired or having little energy: 3 - nearly every day  Poor appetite or overeatin - several days  Feeling bad about yourself - or that you are a failure or have let yourself or your family down: 0 - not at all  Trouble concentrating on things, such as reading the newspaper or watching television: 0 - not at all  Moving or speaking so slowly that other people could have noticed. Or the opposite - being so fidgety or restless that you have been moving around a lot more than usual: 0 - not at all  Thoughts that you would be better off dead, or of hurting yourself in some way: 0 - not at all  PHQ-2 Score: 3  PHQ-2 Interpretation: POSITIVE depression screen  PHQ-9 Score: 7  PHQ-9 Interpretation: Mild depression       Depression Screening Follow-up Plan: Patient's depression screening was positive with a PHQ-2 score of 3. Their PHQ-9 score was 7. Patient assessed for underlying major depression. They have no active suicidal ideations. Brief counseling provided and recommend additional  follow-up/re-evaluation next office visit.       The following portions of the patient's history were reviewed and updated as appropriate: allergies, current medications, past family history, past medical history, past social history, past surgical history and problem list.      Review of Systems   Constitutional:  Positive for chills, fever and weight loss. Negative for diaphoresis, fatigue and unexpected weight change.   HENT:  Negative for congestion, dental problem, drooling, ear discharge, ear pain, facial swelling, hearing loss, mouth sores, nosebleeds, postnasal drip, rhinorrhea, sinus pressure, sinus pain, sneezing, sore throat, tinnitus, trouble swallowing and voice change.    Respiratory:  Positive for cough and shortness of breath. Negative for chest tightness and wheezing.    Cardiovascular:  Positive for chest pain.   Gastrointestinal:  Negative for abdominal pain, constipation, diarrhea, nausea and vomiting.   Musculoskeletal:  Positive for myalgias.   Skin: Negative.  Negative for rash.   Neurological:  Positive for headaches. Negative for dizziness and light-headedness.         Objective:    Social History     Tobacco Use   Smoking Status Former   • Current packs/day: 0.00   • Average packs/day: 0.3 packs/day for 18.2 years (4.5 ttl pk-yrs)   • Types: Cigarettes   • Start date: 1971   • Quit date: 1989   • Years since quittin.1   • Passive exposure: Never   Smokeless Tobacco Never       Allergies:   Allergies   Allergen Reactions   • Sulfa Antibiotics Hives         Current Outpatient Medications   Medication Sig Dispense Refill   • amLODIPine (NORVASC) 10 mg tablet Take 1 tablet by mouth daily. 90 tablet 0   • famotidine (PEPCID) 20 mg tablet Take 20 mg by mouth daily     • hydroCHLOROthiazide 12.5 mg capsule Take 1 capsule by mouth daily. 90 capsule 1   • Hydrocod Jn-Chlorphe Jn ER (TUSSIONEX) 10-8 mg/5 mL ER suspension Take 5 mL by mouth every 12 (twelve) hours as needed for cough  Max Daily Amount: 10 mL 115 mL 0   • levofloxacin (LEVAQUIN) 750 mg tablet Take 1 tablet (750 mg total) by mouth every 24 hours for 7 days 7 tablet 0   • omeprazole (PriLOSEC) 40 MG capsule TAKE ONE CAPSULE BY MOUTH DAILY (Patient taking differently: 3 times a week) 90 capsule 1   • Tirosint 175 MCG CAPS Take 1 capsule by mouth daily. 90 capsule 0     Current Facility-Administered Medications   Medication Dose Route Frequency Provider Last Rate Last Admin   • bupivacaine (PF) (MARCAINE) 0.25 % injection 1 mL  1 mL Intra-articular  Christopher Seeway Cardona, DPM   1 mL at 07/10/23 1030   • triamcinolone acetonide (KENALOG-40) 40 mg/mL injection 40 mg  40 mg Intra-articular  Christopher Seeway Cardona, DPM   40 mg at 07/10/23 1030          Physical Exam  Vitals reviewed.   Constitutional:       Appearance: Normal appearance. She is well-developed.   HENT:      Head: Normocephalic.      Right Ear: Tympanic membrane, ear canal and external ear normal.      Left Ear: Tympanic membrane, ear canal and external ear normal.      Nose: Nose normal.      Right Sinus: No maxillary sinus tenderness or frontal sinus tenderness.      Left Sinus: No maxillary sinus tenderness or frontal sinus tenderness.      Mouth/Throat:      Mouth: No oral lesions.      Pharynx: No oropharyngeal exudate or posterior oropharyngeal erythema.   Cardiovascular:      Rate and Rhythm: Normal rate and regular rhythm.      Heart sounds: Normal heart sounds.   Pulmonary:      Effort: Pulmonary effort is normal.      Breath sounds: Examination of the left-upper field reveals decreased breath sounds. Examination of the right-lower field reveals rhonchi. Decreased breath sounds and rhonchi present.   Musculoskeletal:         General: Normal range of motion.      Cervical back: Neck supple.   Lymphadenopathy:      Cervical:      Right cervical: No superficial or posterior cervical adenopathy.     Left cervical: No superficial or posterior cervical adenopathy.   Skin:      General: Skin is warm and dry.   Neurological:      Mental Status: She is alert and oriented to person, place, and time.   Psychiatric:         Behavior: Behavior normal.         Thought Content: Thought content normal.         Judgment: Judgment normal.                     MYRON Miller

## 2024-05-04 DIAGNOSIS — I10 ESSENTIAL HYPERTENSION: ICD-10-CM

## 2024-05-05 RX ORDER — HYDROCHLOROTHIAZIDE 12.5 MG/1
CAPSULE, GELATIN COATED ORAL
Qty: 90 CAPSULE | Refills: 0 | Status: SHIPPED | OUTPATIENT
Start: 2024-05-05

## 2024-09-23 DIAGNOSIS — I10 ESSENTIAL HYPERTENSION: ICD-10-CM

## 2024-09-24 RX ORDER — AMLODIPINE BESYLATE 10 MG/1
10 TABLET ORAL DAILY
Qty: 90 TABLET | Refills: 1 | Status: SHIPPED | OUTPATIENT
Start: 2024-09-24

## 2024-10-03 DIAGNOSIS — E89.0 HYPOTHYROIDISM, POSTRADIOIODINE THERAPY: ICD-10-CM

## 2024-10-04 ENCOUNTER — TELEPHONE (OUTPATIENT)
Age: 64
End: 2024-10-04

## 2024-10-04 RX ORDER — LEVOTHYROXINE SODIUM 175 UG/1
1 CAPSULE ORAL DAILY
Qty: 90 CAPSULE | Refills: 1 | Status: SHIPPED | OUTPATIENT
Start: 2024-10-04

## 2024-10-04 NOTE — TELEPHONE ENCOUNTER
Caller: Patient    Doctor: Brendan    Reason for call:     Schedule appt with Dr Cardona, transfer    Call back#: n/a

## 2024-10-18 ENCOUNTER — APPOINTMENT (OUTPATIENT)
Dept: LAB | Facility: CLINIC | Age: 64
End: 2024-10-18

## 2024-10-18 DIAGNOSIS — Z00.6 ENCOUNTER FOR EXAMINATION FOR NORMAL COMPARISON OR CONTROL IN CLINICAL RESEARCH PROGRAM: ICD-10-CM

## 2024-10-18 PROCEDURE — 36415 COLL VENOUS BLD VENIPUNCTURE: CPT

## 2024-10-23 DIAGNOSIS — I10 ESSENTIAL HYPERTENSION: ICD-10-CM

## 2024-10-24 RX ORDER — HYDROCHLOROTHIAZIDE 12.5 MG/1
CAPSULE ORAL
Qty: 30 CAPSULE | Refills: 0 | Status: SHIPPED | OUTPATIENT
Start: 2024-10-24

## 2024-10-27 DIAGNOSIS — K21.9 GERD WITHOUT ESOPHAGITIS: ICD-10-CM

## 2024-10-29 LAB
APOB+LDLR+PCSK9 GENE MUT ANL BLD/T: NOT DETECTED
BRCA1+BRCA2 DEL+DUP + FULL MUT ANL BLD/T: NOT DETECTED
MLH1+MSH2+MSH6+PMS2 GN DEL+DUP+FUL M: NOT DETECTED

## 2024-10-29 RX ORDER — OMEPRAZOLE 40 MG/1
40 CAPSULE, DELAYED RELEASE ORAL DAILY
Qty: 90 CAPSULE | Refills: 1 | Status: SHIPPED | OUTPATIENT
Start: 2024-10-29

## 2024-10-29 RX ORDER — FAMOTIDINE 20 MG/1
20 TABLET, FILM COATED ORAL DAILY
Qty: 90 TABLET | Refills: 1 | Status: SHIPPED | OUTPATIENT
Start: 2024-10-29

## 2024-11-11 ENCOUNTER — TELEPHONE (OUTPATIENT)
Age: 64
End: 2024-11-11

## 2024-11-11 DIAGNOSIS — E89.0 HYPOTHYROIDISM, POSTRADIOIODINE THERAPY: Primary | ICD-10-CM

## 2024-11-11 DIAGNOSIS — I10 ESSENTIAL HYPERTENSION: ICD-10-CM

## 2024-11-11 DIAGNOSIS — R73.01 IMPAIRED FASTING GLUCOSE: ICD-10-CM

## 2024-11-11 NOTE — TELEPHONE ENCOUNTER
Mammogram order already in chart and mammogram is scheduled.    I sent in her lab orders to [facility]  She will to fast  Thank you,  Wendy Suarez, DO

## 2024-11-11 NOTE — TELEPHONE ENCOUNTER
Pt called requesting lab blood work orders as well as Mammo prior to her upcoming appt please.     Pt advise

## 2024-11-22 DIAGNOSIS — I10 ESSENTIAL HYPERTENSION: ICD-10-CM

## 2024-11-22 RX ORDER — HYDROCHLOROTHIAZIDE 12.5 MG/1
12.5 CAPSULE ORAL DAILY
Qty: 30 CAPSULE | Refills: 3 | Status: SHIPPED | OUTPATIENT
Start: 2024-11-22

## 2024-11-26 ENCOUNTER — OFFICE VISIT (OUTPATIENT)
Dept: PODIATRY | Facility: CLINIC | Age: 64
End: 2024-11-26
Payer: COMMERCIAL

## 2024-11-26 VITALS
BODY MASS INDEX: 32.78 KG/M2 | SYSTOLIC BLOOD PRESSURE: 131 MMHG | HEIGHT: 68 IN | OXYGEN SATURATION: 97 % | DIASTOLIC BLOOD PRESSURE: 78 MMHG | HEART RATE: 83 BPM

## 2024-11-26 DIAGNOSIS — M19.072 DJD (DEGENERATIVE JOINT DISEASE), ANKLE AND FOOT, LEFT: ICD-10-CM

## 2024-11-26 DIAGNOSIS — M25.872 IMPINGEMENT SYNDROME OF LEFT ANKLE: Primary | ICD-10-CM

## 2024-11-26 DIAGNOSIS — M76.822 POSTERIOR TIBIAL TENDON DYSFUNCTION, LEFT: ICD-10-CM

## 2024-11-26 DIAGNOSIS — M21.42 PES PLANOVALGUS, ACQUIRED, LEFT: ICD-10-CM

## 2024-11-26 PROCEDURE — 20600 DRAIN/INJ JOINT/BURSA W/O US: CPT | Performed by: PODIATRIST

## 2024-11-26 PROCEDURE — 99213 OFFICE O/P EST LOW 20 MIN: CPT | Performed by: PODIATRIST

## 2024-11-26 RX ORDER — BUPIVACAINE HYDROCHLORIDE 2.5 MG/ML
1 INJECTION, SOLUTION EPIDURAL; INFILTRATION; INTRACAUDAL
Status: SHIPPED | OUTPATIENT
Start: 2024-11-26

## 2024-11-26 RX ORDER — TRIAMCINOLONE ACETONIDE 40 MG/ML
20 INJECTION, SUSPENSION INTRA-ARTICULAR; INTRAMUSCULAR
Status: SHIPPED | OUTPATIENT
Start: 2024-11-26

## 2024-11-26 RX ADMIN — BUPIVACAINE HYDROCHLORIDE 1 ML: 2.5 INJECTION, SOLUTION EPIDURAL; INFILTRATION; INTRACAUDAL at 09:15

## 2024-11-26 RX ADMIN — TRIAMCINOLONE ACETONIDE 20 MG: 40 INJECTION, SUSPENSION INTRA-ARTICULAR; INTRAMUSCULAR at 09:15

## 2024-11-26 NOTE — PROGRESS NOTES
Assessment/Plan:     On clinical examination, there is a pes planovalgus foot type with reduced range of motion of the midfoot and hindfoot joints.  There is lateral impingement of the hindfoot.  Dorsiflexion of the ankle joint is also reduced.   There is minimal tenderness with palpation of the midfoot joints.  There is no erythema no edema no calor nor ecchymosis to the left lower extremity.     Treatment options were discussed with the patient.  She is not interested in surgical intervention at this time.  Continue ankle bracing as needed with lace up AFO. We discussed the potential benefits of a custom molded AFO such as an [place] brace. She is amenable to repeat injection therapy today, as the last one lasted for nearly 16 months. After prepping of skin with alcohol, a CSI was administered to the lateral aspect of the subtalar joint without complication.  The patient tolerated the injection well.       Recommend follow-up in 4 to 6 weeks, or as needed.        Diagnoses and all orders for this visit:    Impingement syndrome of left ankle    DJD (degenerative joint disease), ankle and foot, left    Pes planovalgus, acquired, left    Posterior tibial tendon dysfunction, left    Other orders  -     Small joint arthrocentesis          Subjective:     Patient ID: Lida Noyola is a 64 y.o. female.    The patient presents today with a chief complaint of recurrent left foot pain.  She had been doing very well since her last visit and states that the pain is relatively mild and intermittent at this time.  She does on occasion still use her lace up ankle brace when her pain is exacerbated.  There is been no history of injury or trauma since her last visit.        Review of Systems   Constitutional: Negative.    HENT: Negative.     Eyes: Negative.    Respiratory: Negative.     Cardiovascular: Negative.    Endocrine: Negative.    Musculoskeletal: Negative.    Neurological: Negative.    Hematological: Negative.   "  Psychiatric/Behavioral: Negative.           Objective:     Physical Exam  Constitutional:       Appearance: Normal appearance.   HENT:      Head: Normocephalic and atraumatic.      Nose: Nose normal.   Pulmonary:      Effort: Pulmonary effort is normal.   Skin:     General: Skin is warm.      Capillary Refill: Capillary refill takes less than 2 seconds.   Neurological:      General: No focal deficit present.      Mental Status: She is alert and oriented to person, place, and time.   Psychiatric:         Mood and Affect: Mood normal.         Behavior: Behavior normal.         Thought Content: Thought content normal.         Small joint arthrocentesis: L subtalar  Universal Protocol:  procedure performed by consultantConsent: Verbal consent obtained.  Risks and benefits: risks, benefits and alternatives were discussed  Consent given by: patient  Time out: Immediately prior to procedure a \"time out\" was called to verify the correct patient, procedure, equipment, support staff and site/side marked as required.  Timeout called at: 11/26/2024 9:10 AM.  Patient understanding: patient states understanding of the procedure being performed  Patient identity confirmed: verbally with patient and provided demographic data  Supporting Documentation  Indications: pain   Procedure Details  Location: foot - L subtalar  Needle size: 25 G  Ultrasound guidance: no  Approach: lateral  Medications administered: 1 mL bupivacaine (PF) 0.25 %; 20 mg triamcinolone acetonide 40 mg/mL    Patient tolerance: patient tolerated the procedure well with no immediate complications          "

## 2024-11-27 ENCOUNTER — HOSPITAL ENCOUNTER (OUTPATIENT)
Dept: RADIOLOGY | Facility: HOSPITAL | Age: 64
Discharge: HOME | End: 2024-11-27
Payer: COMMERCIAL

## 2024-11-27 VITALS — BODY MASS INDEX: 32.58 KG/M2 | WEIGHT: 215 LBS | HEIGHT: 68 IN

## 2024-11-27 DIAGNOSIS — Z12.31 ENCOUNTER FOR SCREENING MAMMOGRAM FOR MALIGNANT NEOPLASM OF BREAST: ICD-10-CM

## 2024-11-27 PROCEDURE — 77063 BREAST TOMOSYNTHESIS BI: CPT

## 2024-11-27 PROCEDURE — 77067 SCR MAMMO BI INCL CAD: CPT

## 2024-11-29 ENCOUNTER — RESULTS FOLLOW-UP (OUTPATIENT)
Dept: FAMILY MEDICINE CLINIC | Facility: CLINIC | Age: 64
End: 2024-11-29

## 2025-01-17 LAB
ALBUMIN SERPL-MCNC: 4.4 G/DL (ref 3.9–4.9)
ALP SERPL-CCNC: 102 IU/L (ref 44–121)
ALT SERPL-CCNC: 33 IU/L (ref 0–32)
AST SERPL-CCNC: 25 IU/L (ref 0–40)
BILIRUB SERPL-MCNC: 0.4 MG/DL (ref 0–1.2)
BUN SERPL-MCNC: 20 MG/DL (ref 8–27)
BUN/CREAT SERPL: 23 (ref 12–28)
CALCIUM SERPL-MCNC: 10.4 MG/DL (ref 8.7–10.3)
CHLORIDE SERPL-SCNC: 103 MMOL/L (ref 96–106)
CHOLEST SERPL-MCNC: 163 MG/DL (ref 100–199)
CO2 SERPL-SCNC: 26 MMOL/L (ref 20–29)
CREAT SERPL-MCNC: 0.86 MG/DL (ref 0.57–1)
EGFR: 74 ML/MIN/1.73
ERYTHROCYTE [DISTWIDTH] IN BLOOD BY AUTOMATED COUNT: 12.6 % (ref 11.7–15.4)
EST. AVERAGE GLUCOSE BLD GHB EST-MCNC: 126 MG/DL
GLOBULIN SER-MCNC: 2.7 G/DL (ref 1.5–4.5)
GLUCOSE SERPL-MCNC: 116 MG/DL (ref 70–99)
HBA1C MFR BLD: 6 % (ref 4.8–5.6)
HCT VFR BLD AUTO: 43.5 % (ref 34–46.6)
HDLC SERPL-MCNC: 44 MG/DL
HGB BLD-MCNC: 14.3 G/DL (ref 11.1–15.9)
LDLC SERPL CALC-MCNC: 102 MG/DL (ref 0–99)
LDLC/HDLC SERPL: 2.3 RATIO (ref 0–3.2)
MCH RBC QN AUTO: 30.2 PG (ref 26.6–33)
MCHC RBC AUTO-ENTMCNC: 32.9 G/DL (ref 31.5–35.7)
MCV RBC AUTO: 92 FL (ref 79–97)
MICRODELETION SYND BLD/T FISH: NORMAL
PLATELET # BLD AUTO: 210 X10E3/UL (ref 150–450)
POTASSIUM SERPL-SCNC: 4.5 MMOL/L (ref 3.5–5.2)
PROT SERPL-MCNC: 7.1 G/DL (ref 6–8.5)
RBC # BLD AUTO: 4.74 X10E6/UL (ref 3.77–5.28)
SL AMB VLDL CHOLESTEROL CALC: 17 MG/DL (ref 5–40)
SODIUM SERPL-SCNC: 142 MMOL/L (ref 134–144)
T4 FREE SERPL-MCNC: 1.98 NG/DL (ref 0.82–1.77)
TRIGL SERPL-MCNC: 91 MG/DL (ref 0–149)
TSH SERPL DL<=0.005 MIU/L-ACNC: 0.41 UIU/ML (ref 0.45–4.5)
WBC # BLD AUTO: 6.6 X10E3/UL (ref 3.4–10.8)

## 2025-01-21 ENCOUNTER — RA CDI HCC (OUTPATIENT)
Dept: OTHER | Facility: HOSPITAL | Age: 65
End: 2025-01-21

## 2025-01-21 PROBLEM — E66.01 SEVERE OBESITY (BMI 35.0-39.9) WITH COMORBIDITY (HCC): Status: RESOLVED | Noted: 2021-04-27 | Resolved: 2025-01-21

## 2025-01-21 NOTE — PROGRESS NOTES
HCC coding opportunities       Chart reviewed, no opportunity found: CHART REVIEWED, NO OPPORTUNITY FOUND        Patients Insurance        Commercial Insurance: [employer]

## 2025-01-28 ENCOUNTER — OFFICE VISIT (OUTPATIENT)
Dept: FAMILY MEDICINE CLINIC | Facility: CLINIC | Age: 65
End: 2025-01-28
Payer: COMMERCIAL

## 2025-01-28 VITALS
HEIGHT: 67 IN | SYSTOLIC BLOOD PRESSURE: 130 MMHG | OXYGEN SATURATION: 98 % | DIASTOLIC BLOOD PRESSURE: 80 MMHG | HEART RATE: 82 BPM | WEIGHT: 220 LBS | TEMPERATURE: 97.4 F | BODY MASS INDEX: 34.53 KG/M2 | RESPIRATION RATE: 18 BRPM

## 2025-01-28 DIAGNOSIS — Z00.00 ANNUAL PHYSICAL EXAM: Primary | ICD-10-CM

## 2025-01-28 DIAGNOSIS — Z23 NEED FOR PNEUMOCOCCAL 20-VALENT CONJUGATE VACCINATION: ICD-10-CM

## 2025-01-28 DIAGNOSIS — I10 ESSENTIAL HYPERTENSION: ICD-10-CM

## 2025-01-28 DIAGNOSIS — Z78.0 POSTMENOPAUSAL: ICD-10-CM

## 2025-01-28 DIAGNOSIS — G25.0 ESSENTIAL TREMOR: ICD-10-CM

## 2025-01-28 DIAGNOSIS — N39.3 FEMALE STRESS INCONTINENCE: ICD-10-CM

## 2025-01-28 DIAGNOSIS — E89.0 HYPOTHYROIDISM, POSTRADIOIODINE THERAPY: ICD-10-CM

## 2025-01-28 PROCEDURE — 90471 IMMUNIZATION ADMIN: CPT

## 2025-01-28 PROCEDURE — 90677 PCV20 VACCINE IM: CPT

## 2025-01-28 PROCEDURE — 99397 PER PM REEVAL EST PAT 65+ YR: CPT | Performed by: FAMILY MEDICINE

## 2025-01-28 NOTE — PROGRESS NOTES
Adult Annual Physical  Name: Lida Noyola      : 1960      MRN: [id number]  Encounter Provider: Wendy Suarez DO  Encounter Date: 2025   Encounter department: [facility]    Assessment & Plan  Annual physical exam         Hypothyroidism, postradioiodine therapy  She doesn't feel over active  Will recheck labs in 6 weeks  Advised to avoid biotin before tests   Orders:  •  T4, free; Future  •  TSH, 3rd generation; Future  •  TSH, 3rd generation; Future  •  T4, free; Future    Female stress incontinence  Declined medication        Postmenopausal    Orders:  •  DXA bone density spine hip and pelvis; Future    Need for pneumococcal 20-valent conjugate vaccination    Orders:  •  Pneumococcal Conjugate Vaccine 20-valent (Pcv20)    Essential hypertension    Orders:  •  CBC; Future  •  Comprehensive metabolic panel; Future  •  Lipid Panel with Direct LDL reflex; Future    Essential tremor  Will monitor  Medication not indicated yet          Immunizations and preventive care screenings were discussed with patient today. Appropriate education was printed on patient's after visit summary.    Counseling:  Dental Health: discussed importance of regular tooth brushing, flossing, and dental visits.  Exercise: the importance of regular exercise/physical activity was discussed. Recommend exercise 3-5 times per week for at least 30 minutes.   Pap smear no longer indicated   Shingrix recommended       Depression Screening and Follow-up Plan: Patient was screened for depression during today's encounter. They screened negative with a PHQ-2 score of 0.    Urinary Incontinence Plan of Care: counseling topics discussed: practice Kegel (pelvic floor strengthening) exercises.     Return in about 6 months (around 2025) for Next scheduled follow up.    History of Present Illness     Adult Annual Physical:  Patient presents for annual physical. She has noticed that she has a tremor in her left hand. .     Diet and  "Physical Activity:  - Diet/Nutrition: well balanced diet.  - Exercise: no formal exercise.    Depression Screening:  - PHQ-2 Score: 0    General Health:  - Sleep: sleeps well.  - Hearing: normal hearing bilateral ears.  - Vision: wears glasses and goes for regular eye exams.  - Dental: regular dental visits.    /GYN Health:  - Follows with GYN: no.   - Menopause: postmenopausal.     Advanced Care Planning:  - Has an advanced directive?: no    - ACP document given to patient?: no      Review of Systems   Constitutional: Negative.    Respiratory: Negative.     Cardiovascular: Negative.          Objective   /80   Pulse 82   Temp (!) 97.4 °F (36.3 °C)   Resp 18   Ht 5' 6.5\" (1.689 m)   Wt 99.8 kg (220 lb)   LMP 06/29/2015 (Exact Date)   SpO2 98%   BMI 34.98 kg/m²     Physical Exam  Vitals and nursing note reviewed.   Constitutional:       Appearance: She is well-developed.   HENT:      Head: Normocephalic and atraumatic.      Right Ear: External ear normal.      Left Ear: External ear normal.      Nose: Nose normal.   Cardiovascular:      Rate and Rhythm: Normal rate and regular rhythm.      Heart sounds: Normal heart sounds. No murmur heard.     No friction rub.   Pulmonary:      Effort: No respiratory distress.      Breath sounds: Normal breath sounds. No wheezing or rales.   Abdominal:      Palpations: Abdomen is soft.      Tenderness: There is no abdominal tenderness.   Musculoskeletal:      Right lower leg: No edema.      Left lower leg: No edema.   Neurological:      Mental Status: She is oriented to person, place, and time.      Cranial Nerves: No cranial nerve deficit.       Vision Screening    Right eye Left eye Both eyes   Without correction 20/50 20/70 20/40   With correction      Comments: Forgot glasses      "

## 2025-01-28 NOTE — ASSESSMENT & PLAN NOTE
Orders:  •  CBC; Future  •  Comprehensive metabolic panel; Future  •  Lipid Panel with Direct LDL reflex; Future

## 2025-01-28 NOTE — ASSESSMENT & PLAN NOTE
She doesn't feel over active  Will recheck labs in 6 weeks  Advised to avoid biotin before tests   Orders:  •  T4, free; Future  •  TSH, 3rd generation; Future  •  TSH, 3rd generation; Future  •  T4, free; Future

## 2025-01-28 NOTE — PATIENT INSTRUCTIONS
"Patient Education     Routine physical for adults   The Basics   Written by the doctors and editors at [facility]   What is a physical? -- A physical is a routine visit, or \"check-up,\" with your doctor. You might also hear it called a \"wellness visit\" or \"preventive visit.\"  During each visit, the doctor will:   Ask about your physical and mental health   Ask about your habits, behaviors, and lifestyle   Do an exam   Give you vaccines if needed   Talk to you about any medicines you take   Give advice about your health   Answer your questions  Getting regular check-ups is an important part of taking care of your health. It can help your doctor find and treat any problems you have. But it's also important for preventing health problems.  A routine physical is different from a \"sick visit.\" A sick visit is when you see a doctor because of a health concern or problem. Since physicals are scheduled ahead of time, you can think about what you want to ask the doctor.  How often should I get a physical? -- It depends on your age and health. In general, for people age 21 years and older:   If you are younger than 50 years, you might be able to get a physical every 3 years.   If you are 50 years or older, your doctor might recommend a physical every year.  If you have an ongoing health condition, like diabetes or high blood pressure, your doctor will probably want to see you more often.  What happens during a physical? -- In general, each visit will include:   Physical exam - The doctor or nurse will check your height, weight, heart rate, and blood pressure. They will also look at your eyes and ears. They will ask about how you are feeling and whether you have any symptoms that bother you.   Medicines - It's a good idea to bring a list of all the medicines you take to each doctor visit. Your doctor will talk to you about your medicines and answer any questions. Tell them if you are having any side effects that bother you. You " "should also tell them if you are having trouble paying for any of your medicines.   Habits and behaviors - This includes:   Your diet   Your exercise habits   Whether you smoke, drink alcohol, or use drugs   Whether you are sexually active   Whether you feel safe at home  Your doctor will talk to you about things you can do to improve your health and lower your risk of health problems. They will also offer help and support. For example, if you want to quit smoking, they can give you advice and might prescribe medicines. If you want to improve your diet or get more physical activity, they can help you with this, too.   Lab tests, if needed - The tests you get will depend on your age and situation. For example, your doctor might want to check your:   Cholesterol   Blood sugar   Iron level   Vaccines - The recommended vaccines will depend on your age, health, and what vaccines you already had. Vaccines are very important because they can prevent certain serious or deadly infections.   Discussion of screening - \"Screening\" means checking for diseases or other health problems before they cause symptoms. Your doctor can recommend screening based on your age, risk, and preferences. This might include tests to check for:   Cancer, such as breast, prostate, cervical, ovarian, colorectal, prostate, lung, or skin cancer   Sexually transmitted infections, such as chlamydia and gonorrhea   Mental health conditions like depression and anxiety  Your doctor will talk to you about the different types of screening tests. They can help you decide which screenings to have. They can also explain what the results might mean.   Answering questions - The physical is a good time to ask the doctor or nurse questions about your health. If needed, they can refer you to other doctors or specialists, too.  Adults older than 65 years often need other care, too. As you get older, your doctor will talk to you about:   How to prevent falling at " home   Hearing or vision tests   Memory testing   How to take your medicines safely   Making sure that you have the help and support you need at home  All topics are updated as new evidence becomes available and our peer review process is complete.  This topic retrieved from [employer] on: May 02, 2024.  Topic 939009 Version 1.0  Release: 32.4.3 - C32.122  © 2024 UpToDate, Inc. and/or its affiliates. All rights reserved.  Consumer Information Use and Disclaimer   Disclaimer: This generalized information is a limited summary of diagnosis, treatment, and/or medication information. It is not meant to be comprehensive and should be used as a tool to help the user understand and/or assess potential diagnostic and treatment options. It does NOT include all information about conditions, treatments, medications, side effects, or risks that may apply to a specific patient. It is not intended to be medical advice or a substitute for the medical advice, diagnosis, or treatment of a health care provider based on the health care provider's examination and assessment of a patient's specific and unique circumstances. Patients must speak with a health care provider for complete information about their health, medical questions, and treatment options, including any risks or benefits regarding use of medications. This information does not endorse any treatments or medications as safe, effective, or approved for treating a specific patient. UpToDate, Inc. and its affiliates disclaim any warranty or liability relating to this information or the use thereof.The use of this information is governed by the Terms of Use, available at https://www.woltersGamgeeuwer.com/en/know/clinical-effectiveness-terms. 2024© UpToDate, Inc. and its affiliates and/or licensors. All rights reserved.  Copyright   © 2024 UpToDate, Inc. and/or its affiliates. All rights reserved.

## 2025-03-25 DIAGNOSIS — I10 ESSENTIAL HYPERTENSION: ICD-10-CM

## 2025-03-26 DIAGNOSIS — I10 ESSENTIAL HYPERTENSION: ICD-10-CM

## 2025-03-26 RX ORDER — AMLODIPINE BESYLATE 10 MG/1
10 TABLET ORAL DAILY
Qty: 90 TABLET | Refills: 1 | Status: SHIPPED | OUTPATIENT
Start: 2025-03-26

## 2025-03-27 RX ORDER — HYDROCHLOROTHIAZIDE 12.5 MG/1
12.5 CAPSULE ORAL DAILY
Qty: 30 CAPSULE | Refills: 2 | Status: SHIPPED | OUTPATIENT
Start: 2025-03-27

## 2025-04-01 DIAGNOSIS — E89.0 HYPOTHYROIDISM, POSTRADIOIODINE THERAPY: ICD-10-CM

## 2025-04-02 RX ORDER — LEVOTHYROXINE SODIUM 175 UG/1
1 CAPSULE ORAL DAILY
Qty: 90 CAPSULE | Refills: 1 | Status: SHIPPED | OUTPATIENT
Start: 2025-04-02

## 2025-04-10 ENCOUNTER — HOSPITAL ENCOUNTER (OUTPATIENT)
Dept: RADIOLOGY | Facility: HOSPITAL | Age: 65
Discharge: HOME | End: 2025-04-10
Payer: COMMERCIAL

## 2025-04-10 VITALS — WEIGHT: 220 LBS | HEIGHT: 67 IN | BODY MASS INDEX: 34.53 KG/M2

## 2025-04-10 DIAGNOSIS — Z78.0 POSTMENOPAUSAL: ICD-10-CM

## 2025-04-10 PROCEDURE — 77080 DXA BONE DENSITY AXIAL: CPT

## 2025-04-18 ENCOUNTER — RESULTS FOLLOW-UP (OUTPATIENT)
Dept: FAMILY MEDICINE CLINIC | Facility: CLINIC | Age: 65
End: 2025-04-18

## 2025-04-18 DIAGNOSIS — M85.80 LOW BONE MASS: Primary | ICD-10-CM

## 2025-05-19 ENCOUNTER — OFFICE VISIT (OUTPATIENT)
Dept: FAMILY MEDICINE CLINIC | Facility: CLINIC | Age: 65
End: 2025-05-19
Payer: COMMERCIAL

## 2025-05-19 VITALS
WEIGHT: 226 LBS | DIASTOLIC BLOOD PRESSURE: 80 MMHG | SYSTOLIC BLOOD PRESSURE: 128 MMHG | TEMPERATURE: 97.7 F | RESPIRATION RATE: 18 BRPM | HEART RATE: 68 BPM | OXYGEN SATURATION: 98 % | BODY MASS INDEX: 35.47 KG/M2 | HEIGHT: 67 IN

## 2025-05-19 DIAGNOSIS — S76.012A MUSCLE STRAIN OF LEFT GLUTEAL REGION, INITIAL ENCOUNTER: Primary | ICD-10-CM

## 2025-05-19 PROCEDURE — 99213 OFFICE O/P EST LOW 20 MIN: CPT | Performed by: NURSE PRACTITIONER

## 2025-05-19 RX ORDER — CYCLOBENZAPRINE HCL 10 MG
10 TABLET ORAL
Qty: 20 TABLET | Refills: 0 | Status: SHIPPED | OUTPATIENT
Start: 2025-05-19 | End: 2025-06-08

## 2025-05-19 NOTE — PATIENT INSTRUCTIONS
Patient Education     Cyclobenzaprine (sye kloe TOR za preen)   Brand Names: US Amrix; Fexmid   Brand Names: Cuca AG-Cyclobenzaprine; APO-Cyclobenzaprine; Auro-Cyclobenzaprine; DOM-Cyclobenzaprine [DSC]; Flexeril; JAMP-Cyclobenzaprine; PMS-Cyclobenzaprine; DONALD-Cyclobenzaprine; TEVA-Cyclobenzaprine   What is this drug used for?   It is used to relax muscles.  What do I need to tell my doctor BEFORE I take this drug?   If you are allergic to this drug; any part of this drug; or any other drugs, foods, or substances. Tell your doctor about the allergy and what signs you had.  If you have any of these health problems: Heart block or other heartbeat that is not normal, heart failure (weak heart), liver disease, or an overactive thyroid gland.  If you have had a recent heart attack.  If you have taken certain drugs for depression or Parkinson's disease in the last 14 days. This includes isocarboxazid, phenelzine, tranylcypromine, selegiline, or rasagiline. Very high blood pressure may happen.  If you are taking any of these drugs: Linezolid or methylene blue.  This is not a list of all drugs or health problems that interact with this drug.  Tell your doctor and pharmacist about all of your drugs (prescription or OTC, natural products, vitamins) and health problems. You must check to make sure that it is safe for you to take this drug with all of your drugs and health problems. Do not start, stop, or change the dose of any drug without checking with your doctor.  What are some things I need to know or do while I take this drug?   All products:   Tell all of your health care providers that you take this drug. This includes your doctors, nurses, pharmacists, and dentists.  Avoid driving and doing other tasks or actions that call for you to be alert until you see how this drug affects you.  Be careful in hot weather or while being active. Drink lots of fluids to stop fluid loss.  Talk with your doctor before you use  alcohol, marijuana or other forms of cannabis, or prescription or OTC drugs that may slow your actions.  Do not take this drug for longer than you were told by your doctor.  This drug is used with rest, PT (physical therapy), pain drugs, and other therapies.  Tell your doctor if you are pregnant, plan on getting pregnant, or are breast-feeding. You will need to talk about the benefits and risks to you and the baby.  Tablets:   If you are 65 or older, use this drug with care. You could have more side effects.  Extended-release capsules:   Do not take this drug if you are 65 or older. Talk with your doctor.  What are some side effects that I need to call my doctor about right away?   WARNING/CAUTION: Even though it may be rare, some people may have very bad and sometimes deadly side effects when taking a drug. Tell your doctor or get medical help right away if you have any of the following signs or symptoms that may be related to a very bad side effect:  Signs of an allergic reaction, like rash; hives; itching; red, swollen, blistered, or peeling skin with or without fever; wheezing; tightness in the chest or throat; trouble breathing, swallowing, or talking; unusual hoarseness; or swelling of the mouth, face, lips, tongue, or throat.  Fast or abnormal heartbeat.  A severe and sometimes deadly problem called serotonin syndrome may happen if you take this drug with certain other drugs. Call your doctor right away if you have agitation; change in balance; confusion; hallucinations; fever; fast or abnormal heartbeat; flushing; muscle twitching or stiffness; seizures; shivering or shaking; sweating a lot; severe diarrhea, upset stomach, or throwing up; or severe headache.  What are some other side effects of this drug?   All drugs may cause side effects. However, many people have no side effects or only have minor side effects. Call your doctor or get medical help if any of these side effects or any other side effects  bother you or do not go away:  Feeling dizzy, sleepy, tired, or weak.  Dry mouth.  Constipation.  Upset stomach.  These are not all of the side effects that may occur. If you have questions about side effects, call your doctor. Call your doctor for medical advice about side effects.  You may report side effects to your national health agency.  You may report side effects to the FDA at 1-293.407.6288. You may also report side effects at https://www.fda.gov/medwatch.  How is this drug best taken?   Use this drug as ordered by your doctor. Read all information given to you. Follow all instructions closely.  All products:   Take with or without food.  Extended-release capsules:   Take this drug at the same time of day.  Swallow whole. Do not chew or crush.  If you cannot swallow this drug whole, you may sprinkle the contents on applesauce. If you do this, swallow the mixture right away without chewing.  Rinse mouth to make sure all contents have been swallowed.  Throw away any part of opened capsule left over after the dose is given.  What do I do if I miss a dose?   Tablets:   If you take this drug on a regular basis, take a missed dose as soon as you think about it.  If it is close to the time for your next dose, skip the missed dose and go back to your normal time.  Do not take 2 doses at the same time or extra doses.  Many times this drug is taken on an as needed basis. Do not take more often than told by the doctor.  Extended-release capsules:   Take a missed dose as soon as you think about it.  If it is close to the time for your next dose, skip the missed dose and go back to your normal time.  Do not take 2 doses at the same time or extra doses.  How do I store and/or throw out this drug?   Store at room temperature in a dry place. Do not store in a bathroom.  Keep all drugs in a safe place. Keep all drugs out of the reach of children and pets.  Throw away unused or  drugs. Do not flush down a toilet or  pour down a drain unless you are told to do so. Check with your pharmacist if you have questions about the best way to throw out drugs. There may be drug take-back programs in your area.  General drug facts   If your symptoms or health problems do not get better or if they become worse, call your doctor.  Do not share your drugs with others and do not take anyone else's drugs.  Some drugs may have another patient information leaflet. If you have any questions about this drug, please talk with your doctor, nurse, pharmacist, or other health care provider.  Some drugs may have another patient information leaflet. Check with your pharmacist. If you have any questions about this drug, please talk with your doctor, nurse, pharmacist, or other health care provider.  If you think there has been an overdose, call your poison control center or get medical care right away. Be ready to tell or show what was taken, how much, and when it happened.  Consumer Information Use and Disclaimer   This generalized information is a limited summary of diagnosis, treatment, and/or medication information. It is not meant to be comprehensive and should be used as a tool to help the user understand and/or assess potential diagnostic and treatment options. It does NOT include all information about conditions, treatments, medications, side effects, or risks that may apply to a specific patient. It is not intended to be medical advice or a substitute for the medical advice, diagnosis, or treatment of a health care provider based on the health care provider's examination and assessment of a patient's specific and unique circumstances. Patients must speak with a health care provider for complete information about their health, medical questions, and treatment options, including any risks or benefits regarding use of medications. This information does not endorse any treatments or medications as safe, effective, or approved for treating a specific  patient. [employer] and its affiliates disclaim any warranty or liability relating to this information or the use thereof. The use of this information is governed by the Terms of Use, available at https://www.woltersOSIXuwer.com/en/know/clinical-effectiveness-terms.  Last Reviewed Date   2024-05-09  Copyright   © 2024 [employer] and its affiliates and/or licensors. All rights reserved.

## 2025-05-19 NOTE — LETTER
May 19, 2025     Patient: Lida Noyola  YOB: 1960  Date of Visit: 5/19/2025      To Whom it May Concern:    Lida Noyola is under my professional care. Lida was seen in my office on 5/19/2025. Lida may return to work on 5/27/25.    If you have any questions or concerns, please don't hesitate to call.         Sincerely,          MYRON Miller        CC: No Recipients

## 2025-05-19 NOTE — PROGRESS NOTES
"Name: Lida Noyola      : 1960      MRN: [id number]  Encounter Provider: MYRON Miller  Encounter Date: 2025   Encounter department: [facility]  :  Assessment & Plan  Muscle strain of left gluteal region, initial encounter  Do not drive and operate any machinery after taking muscle relaxant.   Supportive care discussed and advised.  Advised to RTO for any worsening and no improvement.   Follow up for no improvement and worsening of conditions.  Patient advised and educated when to see immediate medical care.    Orders:  •  cyclobenzaprine (FLEXERIL) 10 mg tablet; Take 1 tablet (10 mg total) by mouth daily at bedtime for 20 days           History of Present Illness   Patient stated that was taking garbage out yesterday and fell forward and pulled muscle in left gluteal area. Stated that having discomfort at the area but no bruise. Using alleve which is helping. Not able  classroom    Back Pain      Review of Systems   HENT: Negative.     Respiratory: Negative.     Cardiovascular: Negative.    Gastrointestinal: Negative.    Musculoskeletal:  Positive for back pain.       Objective   /80   Pulse 68   Temp 97.7 °F (36.5 °C)   Resp 18   Ht 5' 6.5\" (1.689 m)   Wt 103 kg (226 lb)   LMP 2015 (Exact Date)   SpO2 98%   BMI 35.93 kg/m²      Physical Exam  HENT:      Head: Normocephalic.     Eyes:      Conjunctiva/sclera: Conjunctivae normal.       Cardiovascular:      Rate and Rhythm: Normal rate and regular rhythm.      Pulses: Normal pulses.      Heart sounds: Normal heart sounds.   Pulmonary:      Effort: Pulmonary effort is normal.      Breath sounds: Normal breath sounds.     Musculoskeletal:        Legs:      Skin:     General: Skin is warm and dry.     Neurological:      Mental Status: She is alert and oriented to person, place, and time.     Psychiatric:         Mood and Affect: Mood normal.         Behavior: Behavior normal.         Thought Content: Thought " content normal.         Judgment: Judgment normal.

## 2025-05-22 ENCOUNTER — TELEPHONE (OUTPATIENT)
Dept: FAMILY MEDICINE CLINIC | Facility: CLINIC | Age: 65
End: 2025-05-22

## 2025-05-22 NOTE — TELEPHONE ENCOUNTER
[employer] Application scanned into chart and patient has been contacted and advised that her form is ready for .  NFA.

## 2025-06-21 LAB
ALBUMIN SERPL-MCNC: 4.4 G/DL (ref 3.9–4.9)
ALP SERPL-CCNC: 108 IU/L (ref 44–121)
ALT SERPL-CCNC: 33 IU/L (ref 0–32)
AST SERPL-CCNC: 23 IU/L (ref 0–40)
BILIRUB SERPL-MCNC: 0.4 MG/DL (ref 0–1.2)
BUN SERPL-MCNC: 25 MG/DL (ref 8–27)
BUN/CREAT SERPL: 31 (ref 12–28)
CALCIUM SERPL-MCNC: 10.3 MG/DL (ref 8.7–10.3)
CHLORIDE SERPL-SCNC: 104 MMOL/L (ref 96–106)
CHOLEST SERPL-MCNC: 196 MG/DL (ref 100–199)
CO2 SERPL-SCNC: 24 MMOL/L (ref 20–29)
CREAT SERPL-MCNC: 0.8 MG/DL (ref 0.57–1)
EGFR: 81 ML/MIN/1.73
ERYTHROCYTE [DISTWIDTH] IN BLOOD BY AUTOMATED COUNT: 12.8 % (ref 11.7–15.4)
GLOBULIN SER-MCNC: 2.6 G/DL (ref 1.5–4.5)
GLUCOSE SERPL-MCNC: 110 MG/DL (ref 70–99)
HCT VFR BLD AUTO: 45.5 % (ref 34–46.6)
HDLC SERPL-MCNC: 42 MG/DL
HGB BLD-MCNC: 14.5 G/DL (ref 11.1–15.9)
LDLC SERPL CALC-MCNC: 131 MG/DL (ref 0–99)
LDLC/HDLC SERPL: 3.1 RATIO (ref 0–3.2)
MCH RBC QN AUTO: 29.7 PG (ref 26.6–33)
MCHC RBC AUTO-ENTMCNC: 31.9 G/DL (ref 31.5–35.7)
MCV RBC AUTO: 93 FL (ref 79–97)
MICRODELETION SYND BLD/T FISH: NORMAL
PLATELET # BLD AUTO: 202 X10E3/UL (ref 150–450)
POTASSIUM SERPL-SCNC: 4.4 MMOL/L (ref 3.5–5.2)
PROT SERPL-MCNC: 7 G/DL (ref 6–8.5)
RBC # BLD AUTO: 4.88 X10E6/UL (ref 3.77–5.28)
SL AMB VLDL CHOLESTEROL CALC: 23 MG/DL (ref 5–40)
SODIUM SERPL-SCNC: 141 MMOL/L (ref 134–144)
T4 FREE SERPL-MCNC: 1.88 NG/DL (ref 0.82–1.77)
TRIGL SERPL-MCNC: 126 MG/DL (ref 0–149)
TSH SERPL DL<=0.005 MIU/L-ACNC: 0.45 UIU/ML (ref 0.45–4.5)
WBC # BLD AUTO: 6.4 X10E3/UL (ref 3.4–10.8)

## 2025-07-15 DIAGNOSIS — I10 ESSENTIAL HYPERTENSION: ICD-10-CM

## 2025-07-15 DIAGNOSIS — K21.9 GERD WITHOUT ESOPHAGITIS: ICD-10-CM

## 2025-07-15 DIAGNOSIS — E89.0 HYPOTHYROIDISM, POSTRADIOIODINE THERAPY: ICD-10-CM

## 2025-07-16 RX ORDER — HYDROCHLOROTHIAZIDE 12.5 MG/1
12.5 CAPSULE ORAL DAILY
Qty: 90 CAPSULE | Refills: 1 | Status: SHIPPED | OUTPATIENT
Start: 2025-07-16

## 2025-07-16 RX ORDER — AMLODIPINE BESYLATE 10 MG/1
10 TABLET ORAL DAILY
Qty: 90 TABLET | Refills: 1 | Status: SHIPPED | OUTPATIENT
Start: 2025-07-16

## 2025-07-16 RX ORDER — LEVOTHYROXINE SODIUM 175 UG/1
1 CAPSULE ORAL DAILY
Qty: 90 CAPSULE | Refills: 1 | Status: SHIPPED | OUTPATIENT
Start: 2025-07-16

## 2025-07-16 RX ORDER — OMEPRAZOLE 40 MG/1
40 CAPSULE, DELAYED RELEASE ORAL DAILY
Qty: 90 CAPSULE | Refills: 1 | Status: SHIPPED | OUTPATIENT
Start: 2025-07-16